# Patient Record
Sex: MALE | Race: BLACK OR AFRICAN AMERICAN | Employment: OTHER | URBAN - METROPOLITAN AREA
[De-identification: names, ages, dates, MRNs, and addresses within clinical notes are randomized per-mention and may not be internally consistent; named-entity substitution may affect disease eponyms.]

---

## 2017-01-18 ENCOUNTER — OFFICE VISIT (OUTPATIENT)
Dept: PAIN MANAGEMENT | Age: 72
End: 2017-01-18

## 2017-01-18 VITALS
HEIGHT: 73 IN | SYSTOLIC BLOOD PRESSURE: 182 MMHG | BODY MASS INDEX: 35.25 KG/M2 | DIASTOLIC BLOOD PRESSURE: 93 MMHG | HEART RATE: 67 BPM | WEIGHT: 266 LBS

## 2017-01-18 DIAGNOSIS — M25.569 ARTHRALGIA OF LOWER LEG, UNSPECIFIED LATERALITY: ICD-10-CM

## 2017-01-18 DIAGNOSIS — G89.29 CHRONIC LOW BACK PAIN, UNSPECIFIED BACK PAIN LATERALITY, WITH SCIATICA PRESENCE UNSPECIFIED: ICD-10-CM

## 2017-01-18 DIAGNOSIS — Z79.899 ENCOUNTER FOR LONG-TERM (CURRENT) USE OF HIGH-RISK MEDICATION: ICD-10-CM

## 2017-01-18 DIAGNOSIS — M46.1 SACROILIITIS (HCC): Primary | ICD-10-CM

## 2017-01-18 DIAGNOSIS — M51.37 DEGENERATION OF LUMBAR OR LUMBOSACRAL INTERVERTEBRAL DISC: ICD-10-CM

## 2017-01-18 DIAGNOSIS — K59.03 DRUG-INDUCED CONSTIPATION: ICD-10-CM

## 2017-01-18 DIAGNOSIS — M54.5 CHRONIC LOW BACK PAIN, UNSPECIFIED BACK PAIN LATERALITY, WITH SCIATICA PRESENCE UNSPECIFIED: ICD-10-CM

## 2017-01-18 DIAGNOSIS — F51.04 CHRONIC INSOMNIA: ICD-10-CM

## 2017-01-18 LAB
ALCOHOL UR POC: NORMAL
AMPHETAMINES UR POC: NORMAL
BARBITURATES UR POC: NORMAL
BENZODIAZEPINES UR POC: NORMAL
BUPRENORPHINE UR POC: NORMAL
CANNABINOIDS UR POC: NORMAL
CARISOPRODOL UR POC: NORMAL
COCAINE UR POC: NORMAL
FENTANYL UR POC: NORMAL
MDMA/ECSTASY UR POC: NORMAL
METHADONE UR POC: NORMAL
METHAMPHETAMINE UR POC: NORMAL
METHYLPHENIDATE UR POC: NORMAL
OPIATES UR POC: NORMAL
OXYCODONE UR POC: NORMAL
PHENCYCLIDINE UR POC: NORMAL
PROPOXYPHENE UR POC: NORMAL
TRAMADOL UR POC: NORMAL
TRICYCLICS UR POC: NORMAL

## 2017-01-18 RX ORDER — OXYCODONE AND ACETAMINOPHEN 10; 325 MG/1; MG/1
1 TABLET ORAL
Qty: 270 TAB | Refills: 0 | Status: SHIPPED | OUTPATIENT
Start: 2017-01-18 | End: 2017-04-11 | Stop reason: SDUPTHER

## 2017-01-18 RX ORDER — NALOXONE HYDROCHLORIDE 4 MG/.1ML
1 SPRAY NASAL ONCE
Qty: 1 BOTTLE | Refills: 0 | Status: SHIPPED | OUTPATIENT
Start: 2017-01-18 | End: 2017-01-18

## 2017-01-18 RX ORDER — MORPHINE SULFATE 60 MG/1
60 TABLET, FILM COATED, EXTENDED RELEASE ORAL 3 TIMES DAILY
Qty: 270 TAB | Refills: 0 | Status: SHIPPED | OUTPATIENT
Start: 2017-01-18 | End: 2017-04-11 | Stop reason: SDUPTHER

## 2017-01-18 RX ORDER — ZOLPIDEM TARTRATE 10 MG/1
10 TABLET ORAL
Qty: 90 TAB | Refills: 0 | Status: SHIPPED | OUTPATIENT
Start: 2017-01-18 | End: 2017-04-11 | Stop reason: SDUPTHER

## 2017-01-18 NOTE — PROGRESS NOTES
HISTORY OF PRESENT ILLNESS  Jim Balderrama is a 73 y.o. male. Patient presents for follow up of chronic, severe pain due to generalized osteoarthritis. He also suffers from chronic cervical and lumbar pain related to underlying spondylosis and degenerative disc disease. He reports that his pain in the lower back pain has been more pronounced over the past several weeks, to which he attributes recent inclement weather. Pain continues to predominate in the lumbar spine, knees, and hips. Medications continue to work well for pain control overall. Jim Balderrama is tolerating medications well, with no untoward side effects noted. He is able to stay more active with less discomfort with these current doses. The patient reports an average of 50% relief with this regimen. Most recent UDS and  were consistent with prescribed medications. Pill counts are appropriate. He is informed of side effects, risks, and benefits of this regimen, and emphasizes that he derives a significant improvement in functionality and quality of life, and notes that non-opioid medications and therapies in the past have not offered significant benefit. He denies new or worsening constipation issues. He denies any falls, injuries, or hospitalizations since the last visit. Sleep continues to be poor and non-restorative in spite of taking Ambien 10 mg nightly. He has history of sleep apnea, but he reports that since he lost over 100 lbs in the past few years, he feels that this has resolved. We discussed the importance of sleep hygiene, including limiting daytime naps. He reports that he falls asleep satisfactorily, but wakes up four hours later and has difficulty falling back to sleep. He goes to bed around 10 pm and wakes up around 6 am. We discussed that sleep needs decrease as humans age, and after the age of 61, the average is 6-7 hours. HPI--see above    ROS  Constitutional: Positive for weight loss and malaise/fatigue.    HENT: Positive for neck pain.    Cardiovascular: Positive for chest pain (occasional -has been followed by pcp and cardiology) and leg swelling. Gastrointestinal: Positive for abdominal pain and constipation. Musculoskeletal: Positive for myalgias, back pain and joint pain. Neurological: Positive for tingling and sensory change (lower legs due to diabetic neuropathy). Psychiatric/Behavioral: Negative for depression. The patient has insomnia. The patient is not nervous/anxious. Physical Exam  Constitutional: He is oriented to person, place, and time. He appears well-developed and well-nourished. No distress. Obese, pleasant male in NAD   HENT:   Head: Normocephalic and atraumatic. Eyes: EOM are normal.   Wears eyeglasses     Musculoskeletal:        Right knee: He exhibits decreased range of motion and bony tenderness. He exhibits no swelling. Tenderness found. Medial joint line tenderness noted. Left knee: He exhibits decreased range of motion and bony tenderness. He exhibits no swelling. Tenderness found. Medial joint line tenderness noted. Lumbar back: He exhibits decreased range of motion, tenderness and bony tenderness. Back:         Legs:  Areas of tenderness noted with red arrows  Marked spasms of lumbar paraspinous musculature noted  Crepitus of bilateral knees with passive ROM    Neurological: He is alert and oriented to person, place, and time. No cranial nerve deficit. He exhibits normal muscle tone. Coordination normal.   Psychiatric: He has a normal mood and affect. His behavior is normal. Judgment and thought content normal.   ASSESSMENT and PLAN    ICD-10-CM ICD-9-CM    1. Sacroiliitis (HCC) M46.1 720.2    2. Encounter for long-term (current) use of high-risk medication Z79.899 V58.69 DRUG SCREEN      AMB POC DRUG SCREEN ()   3. Chronic low back pain, unspecified back pain laterality, with sciatica presence unspecified M54.5 724.2     G89.29 338.29    4.  Degeneration of lumbar or lumbosacral intervertebral disc M51.37 722.52    5. Arthralgia of lower leg, unspecified laterality M25.569 719.46    6. Drug-induced constipation K59.03 564.09      E980.5    7. Chronic insomnia F51.04 780.52       Plan:  Continue same medications as prescribed for chronic pain  Consider changing ambien to trazodone for sleep--not addictive  Poor, fragmented night sleep is a universal symptom that accompanies many chronic pain syndromes. This is made much WORSE by daytime napping (anything longer than 20-30 minutes), as your circadian sleep/wake rhythms become dysregulated. However, daytime fatigue can be overwhelming, and it is often difficult to eliminate napping during the day. Several studies have shown improvement in fatigue with targeted, brief daytime naps. These are most effective if you follow these steps:  1)Lay down in a comfortable spot and nap for 20 minutes (no longer than 30 minutes!). If you don't fall asleep, that is ok. Relaxing comfortably with eyes closed is acceptable. 3) get out of bed. Do not sleep longer than 20-30 minutes. The best time to nap is between noon and 2 pm, and do not nap after 3 pm if possible. Also, remember to keep very regular sleep times and wake-up times every single day. Patients that suffer chronic insomnia with daytime fatigue are very sensitive to erratic sleep times.    Follow up in 3 months or sooner if needed  Regular exercise and attention to emotional health and diet remain the most effective ways to treat chronic pain of all kinds  You may contact me with questions or concerns through [address]

## 2017-01-18 NOTE — MR AVS SNAPSHOT
Visit Information Date & Time Provider Department Dept. Phone Encounter #  
 1/18/2017  9:40 AM Zeny Courser [address] [place] [address] for Pain Management [phone] [id number] Follow-up Instructions Return in about 3 months (around 4/18/2017). Upcoming Health Maintenance Date Due Hepatitis C Screening 1945 DTaP/Tdap/Td series (1 - Tdap) 11/24/1968 FOBT Q 1 YEAR AGE 50-75 11/24/1997 ZOSTER VACCINE AGE 60> 11/24/2007 GLAUCOMA SCREENING Q2Y 11/24/2012 Pneumococcal 65+ Low/Medium Risk (1 of 2 - PCV13) 11/24/2012 MEDICARE YEARLY EXAM 11/24/2012 INFLUENZA AGE 9 TO ADULT 8/1/2016 Allergies as of 1/18/2017  Review Complete On: 1/18/2017 By: Connor Rivera LPN No Known Allergies Current Immunizations  Never Reviewed No immunizations on file. Not reviewed this visit You Were Diagnosed With   
  
 Codes Comments Sac[facility]iliitis ([facility]    -  Primary ICD-10-CM: M46.1 ICD-9-CM: 720.2 Encounter for long-term (current) use of high-risk medication     ICD-10-CM: Z79.899 ICD-9-CM: V58.69 Chronic low back pain, unspecified back pain laterality, with sciatica presence unspecified     ICD-10-CM: M54.5, G89.29 ICD-9-CM: 724.2, 338.29 Degeneration of lumbar or lumbosacral intervertebral disc     ICD-10-CM: M51.37 
ICD-9-CM: 722.52 Arthralgia of lower leg, unspecified laterality     ICD-10-CM: M25.569 ICD-9-CM: 719.46 Drug-induced constipation     ICD-10-CM: K59.03 
ICD-9-CM: 564.09, E980.5 Chronic insomnia     ICD-10-CM: F51.04 
ICD-9-CM: 780.52 Vitals BP Pulse Height(growth percentile) Weight(growth percentile) BMI Smoking Status (!) 182/93 67 6' 1\" (1.854 m) 266 lb (120.7 kg) 35.09 kg/m2 Former Smoker Vitals History BMI and BSA Data Body Mass Index Body Surface Area 35.09 kg/m 2 2.49 m 2 Preferred Pharmacy Pharmacy Name Phone [facility] [address], [address] [address] [address] [facility] [phone] Your Updated Medication List  
  
   
This list is accurate as of: 1/18/17 10:45 AM.  Always use your most recent med list.  
  
  
  
  
 allopurinol 300 mg tablet Commonly known as:  Indy Dakins Take 300 mg by mouth daily. aspirin 325 mg tablet Commonly known as:  ASPIRIN Take 325 mg by mouth daily. CALCIUM CITRATE + D PO Take  by mouth. CARVEDILOL PO Take 25 mg by mouth two (2) times a day. CHLORTHALIDONE PO Take 25 mg by mouth daily. cloNIDine HCl 0.1 mg tablet Commonly known as:  CATAPRES Take 0.2 mg by mouth three (3) times daily. JANUVIA 25 mg tablet Generic drug:  SITagliptin Take 25 mg by mouth daily. morphine CR 60 mg CR tablet Commonly known as:  MS CONTIN Take 1 Tab by mouth three (3) times daily for 90 days. For chronic pain; Indications: CHRONIC PAIN, NEUROPATHIC PAIN, SEVERE PAIN  
  
 naloxone 4 mg/actuation Spry 1 Spray by Nasal route once for 1 dose. To use in case of accidental opioid overdose OTHER Lift for Right Shoe to correct leg length discrepancy  
  
 oxyCODONE-acetaminophen  mg per tablet Commonly known as:  PERCOCET Take 1 Tab by mouth three (3) times daily as needed for Pain for up to 90 days. (90 day bulk)  Indications: PAIN  
  
 SIMVASTATIN PO Take 25 mg by mouth daily. STAXYN 10 mg Tbdl Generic drug:  vardenafil Take  by mouth. VITAMIN C 1,000 mg tablet Generic drug:  ascorbic acid (vitamin C) Take  by mouth.  
  
 zolpidem 10 mg tablet Commonly known as:  AMBIEN Take 1 Tab by mouth nightly as needed for Sleep for up to 90 days. Max Daily Amount: 10 mg.  
  
  
  
  
Prescriptions Printed Refills  
 oxyCODONE-acetaminophen (PERCOCET)  mg per tablet 0  Sig: Take 1 Tab by mouth three (3) times daily as needed for Pain for up to 90 days. (90 day bulk)  Indications: PAIN Class: Print Route: Oral  
 morphine CR (MS CONTIN) 60 mg CR tablet 0 Sig: Take 1 Tab by mouth three (3) times daily for 90 days. For chronic pain; Indications: CHRONIC PAIN, NEUROPATHIC PAIN, SEVERE PAIN Class: Print Route: Oral  
 zolpidem (AMBIEN) 10 mg tablet 0 Sig: Take 1 Tab by mouth nightly as needed for Sleep for up to 90 days. Max Daily Amount: 10 mg.  
 Class: Print Route: Oral  
  
Prescriptions Sent to Pharmacy Refills  
 naloxone 4 mg/actuation spry 0 Si Spray by Nasal route once for 1 dose. To use in case of accidental opioid overdose Class: Normal  
 Pharmacy: [facility] [address] [place] [address] Rd, [address] #: [phone] Route: Nasal  
  
We Performed the Following AMB POC DRUG SCREEN () [ HCP] DRUG SCREEN [[id number] Custom] Follow-up Instructions Return in about 3 months (around 2017). Patient Instructions Plan: 
Continue same medications as prescribed for chronic pain 
Consider changing ambien to trazodone for sleep--not addictive Poor, fragmented night sleep is a universal symptom that accompanies many chronic pain syndromes. This is made much WORSE by daytime napping (anything longer than 20-30 minutes), as your circadian sleep/wake rhythms become dysregulated. However, daytime fatigue can be overwhelming, and it is often difficult to eliminate napping during the day. Several studies have shown improvement in fatigue with targeted, brief daytime naps. These are most effective if you follow these steps: 
1)Lay down in a comfortable spot and nap for 20 minutes (no longer than 30 minutes!). If you don't fall asleep, that is ok. Relaxing comfortably with eyes closed is acceptable. 3) get out of bed. Do not sleep longer than 20-30 minutes.  The best time to nap is between noon and 2 pm, and do not nap after 3 pm if possible. Also, remember to keep very regular sleep times and wake-up times every single day. Patients that suffer chronic insomnia with daytime fatigue are very sensitive to erratic sleep times. Follow up in 3 months or sooner if needed Regular exercise and attention to emotional health and diet remain the most effective ways to treat chronic pain of all kinds You may contact me with questions or concerns through [address] Introducing [facility]! Eli Anderson introduces [employer] patient portal. Now you can access parts of your medical record, email your doctor's office, and request medication refills online. 1. In your internet browser, go to [email or web address] 2. Click on the First Time User? Click Here link in the Sign In box. You will see the New Member Sign Up page. 3. Enter your [employer] Access Code exactly as it appears below. You will not need to use this code after youve completed the sign-up process. If you do not sign up before the expiration date, you must request a new code. · [employer] Access Code: [email or web address] Expires: 4/18/2017 10:45 AM 
 
4. Enter the last four digits of your Social Security Number (xxxx) and Date of Birth (mm/dd/yyyy) as indicated and click Submit. You will be taken to the next sign-up page. 5. Create a [employer] ID. This will be your [employer] login ID and cannot be changed, so think of one that is secure and easy to remember. 6. Create a [employer] password. You can change your password at any time. 7. Enter your Password Reset Question and Answer. This can be used at a later time if you forget your password. 8. Enter your e-mail address. You will receive e-mail notification when new information is available in [address] 9. Click Sign Up. You can now view and download portions of your medical record.  
10. Click the Download Summary menu link to download a portable copy of your medical information. If you have questions, please visit the Frequently Asked Questions section of the [employer] website. Remember, [employer] is NOT to be used for urgent needs. For medical emergencies, dial 911. Now available from your iPhone and Android! Please provide this summary of care documentation to your next provider. Your primary care clinician is listed as [address] [facility] [phone] [facility]. If you have any questions after today's visit, please call [phone].

## 2017-01-18 NOTE — PATIENT INSTRUCTIONS
Plan:  Continue same medications as prescribed for chronic pain  Consider changing ambien to trazodone for sleep--not addictive  Poor, fragmented night sleep is a universal symptom that accompanies many chronic pain syndromes. This is made much WORSE by daytime napping (anything longer than 20-30 minutes), as your circadian sleep/wake rhythms become dysregulated. However, daytime fatigue can be overwhelming, and it is often difficult to eliminate napping during the day. Several studies have shown improvement in fatigue with targeted, brief daytime naps. These are most effective if you follow these steps:  1)Lay down in a comfortable spot and nap for 20 minutes (no longer than 30 minutes!). If you don't fall asleep, that is ok. Relaxing comfortably with eyes closed is acceptable. 3) get out of bed. Do not sleep longer than 20-30 minutes. The best time to nap is between noon and 2 pm, and do not nap after 3 pm if possible. Also, remember to keep very regular sleep times and wake-up times every single day. Patients that suffer chronic insomnia with daytime fatigue are very sensitive to erratic sleep times.    Follow up in 3 months or sooner if needed  Regular exercise and attention to emotional health and diet remain the most effective ways to treat chronic pain of all kinds  You may contact me with questions or concerns through [address]

## 2017-01-18 NOTE — PROGRESS NOTES
Nursing Notes    Patient presents to the office today in follow-up. Patient rates his pain at 8/10 on the numerical pain scale. Reviewed medications with counts as follows:    Rx Date filled Qty Dispensed Pill Count Last Dose Short   Morphine sulfate ER 60 mg 10/25/16 270 15 today no   Percocet 10/325 mg 10/25/16 270 15 today no                            POC UDS was performed in office today    Any new labs or imaging since last appointment? NO    Have you been to an emergency room (ER) or urgent care clinic since your last visit? NO            Have you been hospitalized since your last visit? NO     If yes, where, when, and reason for visit? Have you seen or consulted any other health care providers outside of the [address] [place] [address]  since your last visit? NO     If yes, where, when, and reason for visit? HM deferred to pcp.

## 2017-04-11 ENCOUNTER — OFFICE VISIT (OUTPATIENT)
Dept: PAIN MANAGEMENT | Age: 72
End: 2017-04-11

## 2017-04-11 VITALS — HEART RATE: 93 BPM | SYSTOLIC BLOOD PRESSURE: 157 MMHG | RESPIRATION RATE: 19 BRPM | DIASTOLIC BLOOD PRESSURE: 99 MMHG

## 2017-04-11 DIAGNOSIS — M46.1 SACROILIITIS (HCC): ICD-10-CM

## 2017-04-11 DIAGNOSIS — K59.03 DRUG-INDUCED CONSTIPATION: ICD-10-CM

## 2017-04-11 DIAGNOSIS — G89.29 CHRONIC LOW BACK PAIN, UNSPECIFIED BACK PAIN LATERALITY, WITH SCIATICA PRESENCE UNSPECIFIED: Primary | ICD-10-CM

## 2017-04-11 DIAGNOSIS — M54.5 CHRONIC LOW BACK PAIN, UNSPECIFIED BACK PAIN LATERALITY, WITH SCIATICA PRESENCE UNSPECIFIED: Primary | ICD-10-CM

## 2017-04-11 DIAGNOSIS — Z79.899 ENCOUNTER FOR LONG-TERM (CURRENT) USE OF HIGH-RISK MEDICATION: ICD-10-CM

## 2017-04-11 DIAGNOSIS — M15.9 GENERALIZED OA: ICD-10-CM

## 2017-04-11 DIAGNOSIS — F51.04 CHRONIC INSOMNIA: ICD-10-CM

## 2017-04-11 DIAGNOSIS — M51.37 DEGENERATION OF LUMBAR OR LUMBOSACRAL INTERVERTEBRAL DISC: ICD-10-CM

## 2017-04-11 RX ORDER — MORPHINE SULFATE 60 MG/1
60 TABLET, FILM COATED, EXTENDED RELEASE ORAL 3 TIMES DAILY
Qty: 270 TAB | Refills: 0 | Status: SHIPPED | OUTPATIENT
Start: 2017-04-19 | End: 2017-07-06 | Stop reason: SDUPTHER

## 2017-04-11 RX ORDER — LUBIPROSTONE 24 UG/1
24 CAPSULE, GELATIN COATED ORAL
Qty: 90 CAP | Refills: 0 | Status: SHIPPED | OUTPATIENT
Start: 2017-04-11 | End: 2017-07-10

## 2017-04-11 RX ORDER — OXYCODONE AND ACETAMINOPHEN 10; 325 MG/1; MG/1
1 TABLET ORAL
Qty: 270 TAB | Refills: 0 | Status: SHIPPED | OUTPATIENT
Start: 2017-04-19 | End: 2017-07-06 | Stop reason: SDUPTHER

## 2017-04-11 RX ORDER — ZOLPIDEM TARTRATE 10 MG/1
10 TABLET ORAL
Qty: 90 TAB | Refills: 0 | Status: SHIPPED | OUTPATIENT
Start: 2017-04-19 | End: 2017-07-06 | Stop reason: SDUPTHER

## 2017-04-11 NOTE — PROGRESS NOTES
HISTORY OF PRESENT ILLNESS  Kirsty Fitzpatrick is a 73 y.o. male. Patient presents for follow up of chronic, severe pain due to generalized osteoarthritis. He also suffers from chronic cervical and lumbar pain related to underlying spondylosis and degenerative disc disease. He reports that his pain in the lower back pain has been more stable and generally well controlled over the past three months. Pain continues to predominate in the lumbar spine, knees, and hips. Pain is described as aching, stiff, and stabbing. Pain worsens with prolonged physical exertion, standing, and bending. He denies saddle anesthesia or bowel/bladder dysfunction or radicular symptoms. He reports that he has been suffering with \"some stomach troubles\" over the past couple of weeks. He reports an increase in constipation and nausea, and reports that miralax has not been as helpful of late. He uses Miralax about three times weekly, otherwise his stools become too loose. Over the counter stool softeners have not been effective, and stimulant laxatives produce abdominal cramping. We discussed treatment options at length--see treatment plan below. Medications continue to work well for pain control overall. Kirsty Fitzpatrick is tolerating medications well, with the exception of constipation as discussed above. He is able to stay more active with less discomfort with these current doses. The patient reports an average of 50% relief with this regimen. Most recent UDS and  were consistent with prescribed medications. Pill counts are appropriate. He is informed of side effects, risks, and benefits of this regimen, and emphasizes that he derives a significant improvement in functionality and quality of life, and notes that non-opioid medications and therapies in the past have not offered significant benefit. We will prescribe Naloxone 4 mg nasal spray to use in case of accidental overdose.  The patient is aware not to use Naloxone for any reasons other than opioid toxicity, as its use may precipitate withdrawals. The patient was instructed on directions and indications for use, and has also been advised to inform family members on how to use naloxone if overdose occurs. The patient expresses understanding. He states that he actually already has this prescription in his medicine cabinet at home. He denies new or worsening insomnia issues. He has worked on his sleep hygiene as discussed at his last visit (mainly limiting his daytime naps), and has seen some improvements. He denies any falls, injuries, or hospitalizations since the last visit. HPI--see above    ROS  Constitutional: Positive for weight loss and malaise/fatigue. HENT: Positive for neck pain. Cardiovascular: Positive for chest pain (occasional -has been followed by pcp and cardiology) and leg swelling. Gastrointestinal: Positive for abdominal pain and constipation. Musculoskeletal: Positive for myalgias, back pain and joint pain. Neurological: Positive for tingling and sensory change (lower legs due to diabetic neuropathy). Psychiatric/Behavioral: Negative for depression. The patient has insomnia. The patient is not nervous/anxious. Physical Exam  Constitutional: He is oriented to person, place, and time. He appears well-developed and well-nourished. No distress. Obese, pleasant male in NAD   HENT:   Head: Normocephalic and atraumatic. Eyes: EOM are normal.   Wears eyeglasses     Musculoskeletal:        Right knee: He exhibits decreased range of motion and bony tenderness. He exhibits no swelling. Tenderness found. Medial joint line tenderness noted. Left knee: He exhibits decreased range of motion and bony tenderness. He exhibits no swelling. Tenderness found. Medial joint line tenderness noted. Lumbar back: He exhibits decreased range of motion, tenderness and bony tenderness.         Back:         Legs:  Areas of tenderness noted with red arrows  Marked spasms of lumbar paraspinous musculature noted  Crepitus of bilateral knees with passive ROM    Neurological: He is alert and oriented to person, place, and time. No cranial nerve deficit. He exhibits normal muscle tone. Coordination normal.   Psychiatric: He has a normal mood and affect. His behavior is normal. Judgment and thought content normal.   ASSESSMENT and PLAN    ICD-10-CM ICD-9-CM    1. Chronic low back pain, unspecified back pain laterality, with sciatica presence unspecified M54.5 724.2     G89.29 338.29    2. Chronic insomnia F51.04 780.52    3. Generalized OA M15.9 715.00    4. Drug-induced constipation K59.03 564.09      E980.5    5. Sacroiliitis (HCC) M46.1 720.2    6. Degeneration of lumbar or lumbosacral intervertebral disc M51.37 722.52    7.  Encounter for long-term (current) use of high-risk medication Z79.899 V58.69       Plan:  Continue same medications as prescribed for chronic pain  You may take Amitiza 24 mcg once daily in the morning before breakfast for chronic constipation  Follow up in 3 months or sooner if needed  Regular exercise and attention to emotional health and diet remain the most effective ways to treat chronic pain of all kinds  You may contact me with questions or concerns through [employer]

## 2017-04-11 NOTE — PROGRESS NOTES
Nursing Notes    Patient presents to the office today in follow-up. Reviewed medications with counts as follows:    Rx Date filled Qty Dispensed Pill Count Last Dose Short   Morphine er 60 mg 1/22/17 270 33 This am no   Percocet 10/325 1/22/17 270 34 This am no   Mr. Ania Knott has a reminder for a \"due or due soon\" health maintenance. I have asked that he contact his primary care provider for follow-up on this health maintenance. POC UDS was not performed in office today    Any new labs or imaging since last appointment? YES  Labs    Have you been to an emergency room (ER) or urgent care clinic since your last visit? NO            Have you been hospitalized since your last visit? NO     If yes, where, when, and reason for visit? Have you seen or consulted any other health care providers outside of the [address] [place] [address]  since your last visit? YES     If yes, where, when, and reason for visit?    Eye and kidney specialist

## 2017-04-11 NOTE — PATIENT INSTRUCTIONS
Plan:  Continue same medications as prescribed for chronic pain  You may take Amitiza 24 mcg once daily in the morning before breakfast for chronic constipation  Follow up in 3 months or sooner if needed  Regular exercise and attention to emotional health and diet remain the most effective ways to treat chronic pain of all kinds  You may contact me with questions or concerns through [employer]

## 2017-06-01 PROBLEM — I20.0 UNSTABLE ANGINA (HCC): Status: ACTIVE | Noted: 2017-06-01

## 2017-07-06 ENCOUNTER — OFFICE VISIT (OUTPATIENT)
Dept: PAIN MANAGEMENT | Age: 72
End: 2017-07-06

## 2017-07-06 VITALS
BODY MASS INDEX: 29.52 KG/M2 | SYSTOLIC BLOOD PRESSURE: 129 MMHG | DIASTOLIC BLOOD PRESSURE: 79 MMHG | HEART RATE: 101 BPM | HEIGHT: 74 IN | WEIGHT: 230 LBS

## 2017-07-06 DIAGNOSIS — M25.562 ARTHRALGIA OF BOTH LOWER LEGS: ICD-10-CM

## 2017-07-06 DIAGNOSIS — M51.37 DEGENERATION OF LUMBAR OR LUMBOSACRAL INTERVERTEBRAL DISC: ICD-10-CM

## 2017-07-06 DIAGNOSIS — M15.9 GENERALIZED OA: Primary | ICD-10-CM

## 2017-07-06 DIAGNOSIS — Z79.899 ENCOUNTER FOR LONG-TERM (CURRENT) USE OF HIGH-RISK MEDICATION: ICD-10-CM

## 2017-07-06 DIAGNOSIS — M54.5 CHRONIC LOW BACK PAIN, UNSPECIFIED BACK PAIN LATERALITY, WITH SCIATICA PRESENCE UNSPECIFIED: ICD-10-CM

## 2017-07-06 DIAGNOSIS — M46.1 SACROILIITIS (HCC): ICD-10-CM

## 2017-07-06 DIAGNOSIS — K59.03 DRUG-INDUCED CONSTIPATION: ICD-10-CM

## 2017-07-06 DIAGNOSIS — F51.04 CHRONIC INSOMNIA: ICD-10-CM

## 2017-07-06 DIAGNOSIS — M25.561 ARTHRALGIA OF BOTH LOWER LEGS: ICD-10-CM

## 2017-07-06 DIAGNOSIS — G89.29 CHRONIC LOW BACK PAIN, UNSPECIFIED BACK PAIN LATERALITY, WITH SCIATICA PRESENCE UNSPECIFIED: ICD-10-CM

## 2017-07-06 RX ORDER — MORPHINE SULFATE 60 MG/1
60 TABLET, FILM COATED, EXTENDED RELEASE ORAL 3 TIMES DAILY
Qty: 270 TAB | Refills: 0 | Status: SHIPPED | OUTPATIENT
Start: 2017-07-15 | End: 2017-10-02 | Stop reason: SDUPTHER

## 2017-07-06 RX ORDER — OXYCODONE AND ACETAMINOPHEN 10; 325 MG/1; MG/1
1 TABLET ORAL
Qty: 270 TAB | Refills: 0 | Status: SHIPPED | OUTPATIENT
Start: 2017-07-15 | End: 2017-10-02 | Stop reason: SDUPTHER

## 2017-07-06 RX ORDER — ZOLPIDEM TARTRATE 10 MG/1
10 TABLET ORAL
Qty: 90 TAB | Refills: 0 | Status: SHIPPED | OUTPATIENT
Start: 2017-07-15 | End: 2017-10-02 | Stop reason: SDUPTHER

## 2017-07-06 NOTE — PROGRESS NOTES
Nursing Notes    Patient presents to the office today in follow-up. Patient rates his pain at 5/10 on the numerical pain scale. Reviewed medications with counts as follows:    Rx Date filled Qty Dispensed Pill Count Last Dose Short   ambien 10mg 04/22/17 90 9 yesterday no   Percocet 10-325mg 04/22/17 270 46 today no   MS Contin ER 60mg 04/22/17 270 46 today no                          Comments:     POC UDS was performed in office today    Any new labs or imaging since last appointment? YES    Have you been to an emergency room (ER) or urgent care clinic since your last visit? YES, [address]            Have you been hospitalized since your last visit? YES     If yes, where, when, and reason for visit? Have you seen or consulted any other health care providers outside of the [address] [place] [address]  since your last visit? YES     If yes, where, when, and reason for visit? HM deferred to pcp.

## 2017-07-06 NOTE — PATIENT INSTRUCTIONS
Plan:  Continue same medications as prescribed for chronic pain  For opioid-induced constipation, we will STOP amitiza and change to Linzess 290 mcg daily.  Take with breakfast once daily  Follow up in 3 months or sooner if needed  Regular exercise and attention to emotional health and diet remain the most effective ways to treat chronic pain of all kinds  You may contact me with questions or concerns through [address]

## 2017-07-06 NOTE — PROGRESS NOTES
HISTORY OF PRESENT ILLNESS  Jessica Magana is a 73 y.o. male. Patient presents for follow up of chronic, severe pain due to generalized osteoarthritis. He also suffers from chronic cervical and lumbar pain related to underlying spondylosis and degenerative disc disease. He reports that he was hospitalized at [facility] for three days in early June due to unstable angina. Cardiac cath revealed continued evidence of significant atherosclerosis of the coronary arteries, but the attending cardiologist did not recommend stenting, as he underwent 4-way CABG in May 2016. After medication changes, his blood pressure has been reduced considerably, and MR. Macel Gitelman reports no new angina episodes since discharge. Pain has been somewhat worse due to increased physical activity and stress, but he continues to endorse adequate pain relief with his current regimen and exercise (at his local gym). Pain continues to predominate in the lumbar spine, knees, and hips. Pain is described as aching, stiff, and stabbing. Pain worsens with prolonged physical exertion, standing, and bending. He denies saddle anesthesia or bowel/bladder dysfunction or radicular symptoms. Pt reports average of 3-5/10 pain scale most days with medications. Medications continue to work well for pain control overall. Jessica Magana is tolerating medications well, with the exception of persistent opioid induced constipation. Amitiza was unfortunately not effective in addressing constipation, and even with a daily stool softener or Miralax powder he still only has 1-2 bowel movements per week. We discussed treatment options at length--see treatment plan below. He is able to stay more active with less discomfort with these current doses. The patient reports an average of 60% relief with this regimen. Most recent UDS and  were consistent with prescribed medications. Pill counts are appropriate.  He is informed of side effects, risks, and benefits of this regimen, and emphasizes that he derives a significant improvement in functionality and quality of life, and notes that non-opioid medications and therapies in the past have not offered significant benefit. He denies new or worsening insomnia or constipation issues. He denies any falls, injuries, or hospitalizations since the last visit. HPI--see above    ROS  Constitutional: Positive for weight loss and malaise/fatigue. HENT: Positive for neck pain. Cardiovascular: Positive for chest pain (occasional -has been followed by pcp and cardiology) and leg swelling. Gastrointestinal: Positive for abdominal pain and constipation. Musculoskeletal: Positive for myalgias, back pain and joint pain. Neurological: Positive for tingling and sensory change (lower legs due to diabetic neuropathy). Psychiatric/Behavioral: Negative for depression. The patient has insomnia. The patient is not nervous/anxious. Physical Exam  Constitutional: He is oriented to person, place, and time. He appears well-developed and well-nourished. No distress. Obese, pleasant male in NAD   HENT:   Head: Normocephalic and atraumatic. Eyes: EOM are normal.   Wears eyeglasses     Musculoskeletal:        Right knee: He exhibits decreased range of motion and bony tenderness. He exhibits no swelling. Tenderness found. Medial joint line tenderness noted. Left knee: He exhibits decreased range of motion and bony tenderness. He exhibits no swelling. Tenderness found. Medial joint line tenderness noted. Lumbar back: He exhibits decreased range of motion, tenderness and bony tenderness. Back:         Legs:  Areas of tenderness noted with red arrows  Marked spasms of lumbar paraspinous musculature noted  Crepitus of bilateral knees with passive ROM    Neurological: He is alert and oriented to person, place, and time. No cranial nerve deficit. He exhibits normal muscle tone.  Coordination normal.   Psychiatric: He has a normal mood and affect. His behavior is normal. Judgment and thought content normal.   ASSESSMENT and PLAN    ICD-10-CM ICD-9-CM    1. Generalized OA M15.9 715.00    2. Encounter for long-term (current) use of high-risk medication Z79.899 V58.69 DRUG SCREEN      AMB POC DRUG SCREEN ()   3. Chronic insomnia F51.04 780.52    4. Drug-induced constipation K59.03 564.09      E980.5    5. Chronic low back pain, unspecified back pain laterality, with sciatica presence unspecified M54.5 724.2     G89.29 338.29    6. Sacroiliitis (HCC) M46.1 720.2    7. Degeneration of lumbar or lumbosacral intervertebral disc M51.37 722.52    8. Arthralgia of both lower legs M25.561 719.46     M25.562        Plan:  Continue same medications as prescribed for chronic pain  For opioid-induced constipation, we will STOP amitiza and change to Linzess 290 mcg daily.  Take with breakfast once daily  Follow up in 3 months or sooner if needed  Regular exercise and attention to emotional health and diet remain the most effective ways to treat chronic pain of all kinds  You may contact me with questions or concerns through [address]

## 2017-07-06 NOTE — MR AVS SNAPSHOT
Visit Information Date & Time Provider Department Dept. Phone Encounter #  
 7/6/2017  8:20 AM Theador Madison Perez PA-C [facility] [phone] [id number] Follow-up Instructions Return in about 3 months (around 10/6/2017). Upcoming Health Maintenance Date Due Hepatitis C Screening 1945 DTaP/Tdap/Td series (1 - Tdap) 11/24/1968 FOBT Q 1 YEAR AGE 50-75 11/24/1997 ZOSTER VACCINE AGE 60> 11/24/2007 GLAUCOMA SCREENING Q2Y 11/24/2012 Pneumococcal 65+ Low/Medium Risk (1 of 2 - PCV13) 11/24/2012 MEDICARE YEARLY EXAM 11/24/2012 INFLUENZA AGE 9 TO ADULT 8/1/2017 Allergies as of 7/6/2017  Review Complete On: 7/6/2017 By: Fco Amos No Known Allergies Current Immunizations  Never Reviewed No immunizations on file. Not reviewed this visit You Were Diagnosed With   
  
 Codes Comments Generalized OA    -  Primary ICD-10-CM: M15.9 ICD-9-CM: 715.00 Encounter for long-term (current) use of high-risk medication     ICD-10-CM: Z79.899 ICD-9-CM: V58.69 Chronic insomnia     ICD-10-CM: F51.04 
ICD-9-CM: 780.52 Drug-induced constipation     ICD-10-CM: K59.03 
ICD-9-CM: 564.09, E980.5 Chronic low back pain, unspecified back pain laterality, with sciatica presence unspecified     ICD-10-CM: M54.5, G89.29 ICD-9-CM: 724.2, 338.29 Sacroiliitis ([facility] [address]     ICD-10-CM: M46.1 ICD-9-CM: 720.2 Degeneration of lumbar or lumbosacral intervertebral disc     ICD-10-CM: M51.37 
ICD-9-CM: 722.52 Arthralgia of both lower legs     ICD-10-CM: M25.561, M25.562 ICD-9-CM: 719.46 Vitals BP Pulse Height(growth percentile) Weight(growth percentile) BMI Smoking Status 129/79 (!) 101 6' 2\" (1.88 m) 230 lb (104.3 kg) 29.53 kg/m2 Former Smoker BMI and BSA Data Body Mass Index Body Surface Area  
 29.53 kg/m 2 2.33 m 2 Preferred Pharmacy Pharmacy Name Phone [facility] [address], [address] [phone] Your Updated Medication List  
  
   
This list is accurate as of: 7/6/17  9:35 AM.  Always use your most recent med list.  
  
  
  
  
 allopurinol 300 mg tablet Commonly known as:  Charol Park Take 300 mg by mouth daily. aspirin 325 mg tablet Commonly known as:  ASPIRIN Take 325 mg by mouth daily. CALCIUM CITRATE + D PO Take  by mouth. CARVEDILOL PO Take 25 mg by mouth two (2) times a day. chlorthalidone 50 mg tablet Commonly known as:  Earnie Sleet Take 1 Tab by mouth daily. cloNIDine HCl 0.1 mg tablet Commonly known as:  CATAPRES Take 0.2 mg by mouth three (3) times daily. JANUVIA 25 mg tablet Generic drug:  SITagliptin Take 25 mg by mouth daily. LASIX 40 mg tablet Generic drug:  furosemide Take 40 mg by mouth as needed. linaclotide 290 mcg Cap capsule Commonly known as:  Miles Sheen Take 1 Cap by mouth Daily (before breakfast). For opioid induced constipation  
  
 lubiPROStone 24 mcg capsule Commonly known as:  Yareli Leoner Take 1 Cap by mouth daily (with breakfast) for 90 days. For opioid induced constipation  
  
 morphine CR 60 mg CR tablet Commonly known as:  MS CONTIN Take 1 Tab by mouth three (3) times daily for 90 days. For chronic pain; Indications: Chronic Pain, NEUROPATHIC PAIN, Severe Pain Start taking on:  7/15/2017 OTHER Lift for Right Shoe to correct leg length discrepancy  
  
 oxyCODONE-acetaminophen  mg per tablet Commonly known as:  PERCOCET Take 1 Tab by mouth three (3) times daily as needed for Pain for up to 90 days. (90 day bulk)  Indications: Pain Start taking on:  7/15/2017 SIMVASTATIN PO Take 20 mg by mouth nightly. STAXYN 10 mg Tbdi Generic drug:  vardenafil Take  by mouth. VITAMIN C 1,000 mg tablet Generic drug:  ascorbic acid (vitamin C) Take  by mouth.  
  
 zolpidem 10 mg tablet Commonly known as:  AMBIEN Take 1 Tab by mouth nightly as needed for Sleep for up to 90 days. Max Daily Amount: 10 mg. Start taking on:  7/15/2017 Prescriptions Printed Refills  
 oxyCODONE-acetaminophen (PERCOCET)  mg per tablet 0 Starting on: 7/15/2017 Sig: Take 1 Tab by mouth three (3) times daily as needed for Pain for up to 90 days. (90 day bulk)  Indications: Pain Class: Print Route: Oral  
 morphine CR (MS CONTIN) 60 mg CR tablet 0 Starting on: 7/15/2017 Sig: Take 1 Tab by mouth three (3) times daily for 90 days. For chronic pain; Indications: Chronic Pain, NEUROPATHIC PAIN, Severe Pain Class: Print Route: Oral  
 zolpidem (AMBIEN) 10 mg tablet 0 Starting on: 7/15/2017 Sig: Take 1 Tab by mouth nightly as needed for Sleep for up to 90 days. Max Daily Amount: 10 mg.  
 Class: Print Route: Oral  
  
Prescriptions Sent to Pharmacy Refills  
 linaclotide (LINZESS) 290 mcg cap capsule 5 Sig: Take 1 Cap by mouth Daily (before breakfast). For opioid induced constipation Class: Normal  
 [facility] [address] [place] [address] Rd, [address] #: [phone] Route: Oral  
  
We Performed the Following AMB POC DRUG SCREEN () [ [facility]] DRUG SCREEN [[id number] Custom] Follow-up Instructions Return in about 3 months (around 10/6/2017). To-Do List   
 07/13/2017  9:00 AM  
  Appointment with [facility] at [address] [facility],[address] Floor ([phone]) Bring your photo ID and insurance cards to your appointment. If you received an order from your physician, please bring as well.                                                                                                                            To reschedule or cancel, please contact Outpatient Scheduling at [phone]. Please bring a list of ALL medications that you are currently taking (include prescription and over the counter medications). Remember to list the dosages and when you take the medications. Please arrive 15 minutes before your appointment time at the [address] [place] [address] Dr Manzano next to [facility].                                                                                                                                               
  
  
Patient Instructions Plan: 
Continue same medications as prescribed for chronic pain For opioid-induced constipation, we will STOP amitiza and change to Linzess 290 mcg daily. Take with breakfast once daily Follow up in 3 months or sooner if needed Regular exercise and attention to emotional health and diet remain the most effective ways to treat chronic pain of all kinds You may contact me with questions or concerns through [address] Introducing [facility] & HEALTH SERVICES! Shay Stuart introduces [employer] patient portal. Now you can access parts of your medical record, email your doctor's office, and request medication refills online. 1. In your internet browser, go to [email or web address] 2. Click on the First Time User? Click Here link in the Sign In box. You will see the New Member Sign Up page. 3. Enter your [employer] Access Code exactly as it appears below. You will not need to use this code after youve completed the sign-up process. If you do not sign up before the expiration date, you must request a new code. · [employer] Access Code: [email or web address] Expires: 8/30/2017 10:21 AM 
 
4.  Enter the last four digits of your Social Security Number (xxxx) and Date of Birth (mm/dd/yyyy) as indicated and click Submit. You will be taken to the next sign-up page. 5. Create a [employer] ID. This will be your [employer] login ID and cannot be changed, so think of one that is secure and easy to remember. 6. Create a [employer] password. You can change your password at any time. 7. Enter your Password Reset Question and Answer. This can be used at a later time if you forget your password. 8. Enter your e-mail address. You will receive e-mail notification when new information is available in [address] 9. Click Sign Up. You can now view and download portions of your medical record. 10. Click the Download Summary menu link to download a portable copy of your medical information. If you have questions, please visit the Frequently Asked Questions section of the [employer] website. Remember, [employer] is NOT to be used for urgent needs. For medical emergencies, dial 911. Now available from your iPhone and Android! Please provide this summary of care documentation to your next provider. Your primary care clinician is listed as [address] [facility] [address] [facility]. If you have any questions after today's visit, please call [phone].

## 2017-10-02 ENCOUNTER — OFFICE VISIT (OUTPATIENT)
Dept: PAIN MANAGEMENT | Age: 72
End: 2017-10-02

## 2017-10-02 VITALS
WEIGHT: 235 LBS | SYSTOLIC BLOOD PRESSURE: 162 MMHG | DIASTOLIC BLOOD PRESSURE: 81 MMHG | HEART RATE: 57 BPM | BODY MASS INDEX: 31 KG/M2 | RESPIRATION RATE: 16 BRPM | TEMPERATURE: 97.3 F

## 2017-10-02 DIAGNOSIS — G89.29 CHRONIC LOW BACK PAIN, UNSPECIFIED BACK PAIN LATERALITY, WITH SCIATICA PRESENCE UNSPECIFIED: Primary | ICD-10-CM

## 2017-10-02 DIAGNOSIS — M51.37 DEGENERATION OF LUMBAR OR LUMBOSACRAL INTERVERTEBRAL DISC: ICD-10-CM

## 2017-10-02 DIAGNOSIS — M25.561 ARTHRALGIA OF BOTH LOWER LEGS: ICD-10-CM

## 2017-10-02 DIAGNOSIS — M54.5 CHRONIC LOW BACK PAIN, UNSPECIFIED BACK PAIN LATERALITY, WITH SCIATICA PRESENCE UNSPECIFIED: Primary | ICD-10-CM

## 2017-10-02 DIAGNOSIS — K59.03 DRUG-INDUCED CONSTIPATION: ICD-10-CM

## 2017-10-02 DIAGNOSIS — M79.662 PAIN OF LEFT LOWER LEG: ICD-10-CM

## 2017-10-02 DIAGNOSIS — Z79.899 ENCOUNTER FOR LONG-TERM (CURRENT) USE OF HIGH-RISK MEDICATION: ICD-10-CM

## 2017-10-02 DIAGNOSIS — M25.562 ARTHRALGIA OF BOTH LOWER LEGS: ICD-10-CM

## 2017-10-02 DIAGNOSIS — M46.1 SACROILIITIS (HCC): ICD-10-CM

## 2017-10-02 RX ORDER — OXYCODONE AND ACETAMINOPHEN 10; 325 MG/1; MG/1
1 TABLET ORAL
Qty: 270 TAB | Refills: 0 | Status: SHIPPED | OUTPATIENT
Start: 2017-10-05 | End: 2017-12-19 | Stop reason: SDUPTHER

## 2017-10-02 RX ORDER — ZOLPIDEM TARTRATE 10 MG/1
10 TABLET ORAL
Qty: 90 TAB | Refills: 0 | Status: SHIPPED | OUTPATIENT
Start: 2017-10-05 | End: 2017-12-19 | Stop reason: SDUPTHER

## 2017-10-02 RX ORDER — MORPHINE SULFATE 60 MG/1
60 TABLET, FILM COATED, EXTENDED RELEASE ORAL 3 TIMES DAILY
Qty: 270 TAB | Refills: 0 | Status: SHIPPED | OUTPATIENT
Start: 2017-10-05 | End: 2017-12-19 | Stop reason: SDUPTHER

## 2017-10-02 NOTE — PROGRESS NOTES
Nursing Notes    Patient presents to the office today in follow-up. Patient rates his pain at 7/10 on the numerical pain scale. Reviewed medications with counts as follows:    Rx Date filled Qty Dispensed Pill Count Last Dose Short   Morphine sulfate 60 mg ER 07/15/17 270 32 This am no   Percocet 10 mg 07/15/17 270 27 This am no   ambien 10 mg 07/15/17 90 2 Last pm          Comments:  Patient is here today for a follow up appt today he states his pain level today is a 7  He states he has seen his PCM and nephrologist he states labs were taken at both offices. His bp is a little elevated I will assess it again to see if it has changed. Patient states he is under a lot of stress dealing with a death in his family and another family member has cancer    POC UDS was not performed in office today    Any new labs or imaging since last appointment? YES labs at [facility]     Have you been to an emergency room (ER) or urgent care clinic since your last visit? NO            Have you been hospitalized since your last visit? NO     If yes, where, when, and reason for visit? Have you seen or consulted any other health care providers outside of the [facility]  since your last visit? YES   PCM  If yes, where, when, and reason for visit? Mr. Michael Villanueva has a reminder for a \"due or due soon\" health maintenance. I have asked that he contact his primary care provider for follow-up on this health maintenance.

## 2017-10-02 NOTE — MR AVS SNAPSHOT
Visit Information Date & Time Provider Department Dept. Phone Encounter #  
 10/2/2017  9:00 AM Clarice Chaudhari [address] [place] [address] Pain Management [phone] [id number] Follow-up Instructions Return in about 3 months (around 1/2/2018). Follow-up and Disposition History Upcoming Health Maintenance Date Due Hepatitis C Screening 1945 DTaP/Tdap/Td series (1 - Tdap) 11/24/1968 FOBT Q 1 YEAR AGE 50-75 11/24/1997 ZOSTER VACCINE AGE 60> 9/24/2007 GLAUCOMA SCREENING Q2Y 11/24/2012 Pneumococcal 65+ Low/Medium Risk (1 of 2 - PCV13) 11/24/2012 MEDICARE YEARLY EXAM 11/24/2012 INFLUENZA AGE 9 TO ADULT 8/1/2017 Allergies as of 10/2/2017  Review Complete On: 10/2/2017 By: Nell Yung LPN No Known Allergies Current Immunizations  Never Reviewed No immunizations on file. Not reviewed this visit You Were Diagnosed With   
  
 Codes Comments Chronic low back pain, unspecified back pain laterality, with sciatica presence unspecified    -  Primary ICD-10-CM: M54.5, G89.29 ICD-9-CM: 724.2, 338.29 Sacroiliitis ([facility] [address]     ICD-10-CM: M46.1 ICD-9-CM: 720.2 Degeneration of lumbar or lumbosacral intervertebral disc     ICD-10-CM: M51.37 
ICD-9-CM: 722.52 Arthralgia of both lower legs     ICD-10-CM: M25.561, M25.562 ICD-9-CM: 719.46 Pain of left lower leg     ICD-10-CM: Z57.760 ICD-9-CM: 729.5 Encounter for long-term (current) use of high-risk medication     ICD-10-CM: Z79.899 ICD-9-CM: V58.69 Drug-induced constipation     ICD-10-CM: K59.03 
ICD-9-CM: 564.09, E980.5 Vitals BP Pulse Temp Resp Weight(growth percentile) BMI  
 162/81 (!) 57 97.3 °F (36.3 °C) 16 235 lb (106.6 kg) 31 kg/m2 Smoking Status Former Smoker Vitals History BMI and BSA Data Body Mass Index Body Surface Area  
 31 kg/m 2 2.34 m 2 Preferred Pharmacy Pharmacy Name Phone Darian Goldsmith [address], [address] [facility] [address] [facility] [phone] Your Updated Medication List  
  
   
This list is accurate as of: 10/2/17 10:02 AM.  Always use your most recent med list.  
  
  
  
  
 allopurinol 300 mg tablet Commonly known as:  Clementeen Beard Take 300 mg by mouth daily. aspirin 325 mg tablet Commonly known as:  ASPIRIN Take 325 mg by mouth daily. CARVEDILOL PO Take 25 mg by mouth two (2) times a day. chlorthalidone 50 mg tablet Commonly known as:  Suzen Pay Take 1 Tab by mouth daily. cloNIDine HCl 0.1 mg tablet Commonly known as:  CATAPRES Take 0.2 mg by mouth three (3) times daily. ISOSORBIDE DINITRATE PO Take  by mouth. JANUVIA 25 mg tablet Generic drug:  SITagliptin Take 25 mg by mouth daily. LASIX 40 mg tablet Generic drug:  furosemide Take 40 mg by mouth as needed. morphine CR 60 mg CR tablet Commonly known as:  MS CONTIN Take 1 Tab by mouth three (3) times daily for 90 days. For chronic, severe pain; Early fill permitted 08/5/58 due to out of state travel  Indications: Chronic Pain, NEUROPATHIC PAIN, Severe Pain Start taking on:  10/5/2017 OTHER Lift for Right Shoe to correct leg length discrepancy  
  
 oxyCODONE-acetaminophen  mg per tablet Commonly known as:  PERCOCET Take 1 Tab by mouth three (3) times daily as needed for Pain for up to 90 days. (90 day bulk) Early fill permitted 97/7/91 due to out of state travel  Indications: Pain Start taking on:  10/5/2017 SIMVASTATIN PO Take 20 mg by mouth nightly. STAXYN 10 mg Tbdi Generic drug:  vardenafil Take  by mouth.  
  
 zolpidem 10 mg tablet Commonly known as:  AMBIEN Take 1 Tab by mouth nightly as needed for Sleep for up to 90 days. Max Daily Amount: 10 mg. Early fill permitted 12/0/08 due to out of state travel Start taking on:  10/5/2017 Prescriptions Printed Refills  
 oxyCODONE-acetaminophen (PERCOCET)  mg per tablet 0 Starting on: 10/5/2017 Sig: Take 1 Tab by mouth three (3) times daily as needed for Pain for up to 90 days. (90 day bulk) Early fill permitted 46/1/20 due to out of state travel  Indications: Pain Class: Print Route: Oral  
 morphine CR (MS CONTIN) 60 mg CR tablet 0 Starting on: 10/5/2017 Sig: Take 1 Tab by mouth three (3) times daily for 90 days. For chronic, severe pain; Early fill permitted 58/2/39 due to out of state travel  Indications: Chronic Pain, NEUROPATHIC PAIN, Severe Pain Class: Print Route: Oral  
 zolpidem (AMBIEN) 10 mg tablet 0 Starting on: 10/5/2017 Sig: Take 1 Tab by mouth nightly as needed for Sleep for up to 90 days. Max Daily Amount: 10 mg. Early fill permitted 58/2/92 due to out of state travel Class: Print Route: Oral  
  
Follow-up Instructions Return in about 3 months (around 1/2/2018). To-Do List   
 10/04/2017 9:45 AM  
  Appointment with [address] [place] [address] Road at [address] [place] [address],[address] ([phone])  
  
 10/06/2017 9:45 AM  
  Appointment with 11 [facility] at [address] [place] [address],[address] ([phone]) Patient Instructions Plan: 
Continue same medications as prescribed for chronic pain--early fill this month due to out of state travel to [place] Follow up in 3 months or sooner if needed Regular exercise and attention to emotional health and diet remain the most effective ways to treat chronic pain of all kinds You may contact me with questions or concerns through [address] Introducing [facility] & HEALTH SERVICES! Mayte Oreilly introduces [employer] patient portal. Now you can access parts of your medical record, email your doctor's office, and request medication refills online. 1. In your internet browser, go to [email or web address] 2. Click on the First Time User? Click Here link in the Sign In box. You will see the New Member Sign Up page. 3. Enter your [employer] Access Code exactly as it appears below. You will not need to use this code after youve completed the sign-up process. If you do not sign up before the expiration date, you must request a new code. · [employer] Access Code: [email or web address] Expires: 12/4/2017  9:08 AM 
 
4. Enter the last four digits of your Social Security Number (xxxx) and Date of Birth (mm/dd/yyyy) as indicated and click Submit. You will be taken to the next sign-up page. 5. Create a [employer] ID. This will be your [employer] login ID and cannot be changed, so think of one that is secure and easy to remember. 6. Create a [employer] password. You can change your password at any time. 7. Enter your Password Reset Question and Answer. This can be used at a later time if you forget your password. 8. Enter your e-mail address. You will receive e-mail notification when new information is available in [address] 9. Click Sign Up. You can now view and download portions of your medical record. 10. Click the Download Summary menu link to download a portable copy of your medical information. If you have questions, please visit the Frequently Asked Questions section of the [employer] website. Remember, [employer] is NOT to be used for urgent needs. For medical emergencies, dial 911. Now available from your iPhone and Android! Please provide this summary of care documentation to your next provider. Your primary care clinician is listed as [address] [facility] [address]. If you have any questions after today's visit, please call [phone].

## 2017-10-02 NOTE — PROGRESS NOTES
HISTORY OF PRESENT ILLNESS  Ann Reyna is a 73 y.o. male. Patient presents for follow up of chronic, severe pain due to generalized osteoarthritis. He also suffers from chronic cervical and lumbar pain related to underlying spondylosis and degenerative disc disease. He reports that he has been under a great deal of stress, noting that his daughter-in-law  suddenly from a PE after suffering a foot fracture in August. Then his  son was a week later diagnosed with cancer of the throat. His son is also a physician assistant with three children. We discussed these stressors at length. He was encouraged to monitor his blood pressure closely, as it is quite elevated today. Due to high stress levels, his pain has also been more pronounced, particularly in the lower back and knees. Pain is described as aching, stiff, and stabbing. Pain worsens with prolonged physical exertion, standing, and bending. He denies saddle anesthesia or bowel/bladder dysfunction or radicular symptoms. Medications continue to work well for pain control overall. Ann Reyna is tolerating medications well, with the exception of constipation. He reports that he could not afford Linzess, so he has continued with regular use of Miralax powder. This along with increased fluids and fiber has been modestly effective. Ann Reyna is able to stay more active with less discomfort with these current doses. The patient reports an average of 50% relief with this regimen. Most recent UDS and  were consistent with prescribed medications. Pill counts are appropriate. He is informed of side effects, risks, and benefits of this regimen, and emphasizes that he derives a significant improvement in functionality and quality of life, and notes that non-opioid medications and therapies in the past have not offered significant benefit.  He was approved for 90 day bulk on his pain medications several years ago and has thus far displayed no concerns for abuse or diversion of medications. We discussed the departure of Dr. Roverto Urena and myself from the practice. After prolonged discussion, he elects to stay with our practice for the time being. He denies new or worsening insomnia or constipation issues. He denies any falls, injuries, or hospitalizations since the last visit. A total of 45 minutes was spent with the patient of which more than 50% of the time was spent counseling the patient. HPI--see above    ROS  Constitutional: Positive for weight loss and malaise/fatigue. HENT: Positive for neck pain. Cardiovascular: Positive for chest pain (occasional -has been followed by pcp and cardiology) and leg swelling. Gastrointestinal: Positive for abdominal pain and constipation. Musculoskeletal: Positive for myalgias, back pain and joint pain. Neurological: Positive for tingling and sensory change (lower legs due to diabetic neuropathy). Psychiatric/Behavioral: Negative for depression. The patient has insomnia. The patient is not nervous/anxious. Physical Exam  Constitutional: He is oriented to person, place, and time. He appears well-developed and well-nourished. No distress. Obese, pleasant male in NAD   HENT:   Head: Normocephalic and atraumatic. Eyes: EOM are normal.   Wears eyeglasses     Musculoskeletal:        Right knee: He exhibits decreased range of motion and bony tenderness. He exhibits no swelling. Tenderness found. Medial joint line tenderness noted. Left knee: He exhibits decreased range of motion and bony tenderness. He exhibits no swelling. Tenderness found. Medial joint line tenderness noted. Lumbar back: He exhibits decreased range of motion, tenderness and bony tenderness. Back:         Legs:  Areas of tenderness noted with red arrows  Marked spasms of lumbar paraspinous musculature noted  Crepitus of bilateral knees with passive ROM    Neurological: He is alert and oriented to person, place, and time.  No cranial nerve deficit. He exhibits normal muscle tone. Coordination normal.   Psychiatric: He has a normal mood and affect. His behavior is normal. Judgment and thought content normal.   ASSESSMENT and PLAN    ICD-10-CM ICD-9-CM    1. Chronic low back pain, unspecified back pain laterality, with sciatica presence unspecified M54.5 724.2     G89.29 338.29    2. Sacroiliitis (HCC) M46.1 720.2    3. Degeneration of lumbar or lumbosacral intervertebral disc M51.37 722.52    4. Arthralgia of both lower legs M25.561 719.46     M25.562     5. Pain of left lower leg M79.662 729.5    6. Encounter for long-term (current) use of high-risk medication Z79.899 V58.69    7.  Drug-induced constipation K59.03 564.09      E980.5       Plan:  Continue same medications as prescribed for chronic pain--early fill this month due to out of state travel to [place]  Follow up in 3 months or sooner if needed  Regular exercise and attention to emotional health and diet remain the most effective ways to treat chronic pain of all kinds  You may contact me with questions or concerns through [address]

## 2017-10-02 NOTE — PATIENT INSTRUCTIONS
Plan:  Continue same medications as prescribed for chronic pain--early fill this month due to out of state travel to [place]  Follow up in 3 months or sooner if needed  Regular exercise and attention to emotional health and diet remain the most effective ways to treat chronic pain of all kinds  You may contact me with questions or concerns through [address]

## 2017-12-19 ENCOUNTER — OFFICE VISIT (OUTPATIENT)
Dept: PAIN MANAGEMENT | Age: 72
End: 2017-12-19

## 2017-12-19 VITALS
BODY MASS INDEX: 28.1 KG/M2 | SYSTOLIC BLOOD PRESSURE: 140 MMHG | WEIGHT: 212 LBS | RESPIRATION RATE: 14 BRPM | DIASTOLIC BLOOD PRESSURE: 80 MMHG | HEIGHT: 73 IN | TEMPERATURE: 97 F | HEART RATE: 64 BPM

## 2017-12-19 DIAGNOSIS — M51.37 DEGENERATION OF LUMBAR OR LUMBOSACRAL INTERVERTEBRAL DISC: ICD-10-CM

## 2017-12-19 DIAGNOSIS — G89.29 CHRONIC LOW BACK PAIN, UNSPECIFIED BACK PAIN LATERALITY, WITH SCIATICA PRESENCE UNSPECIFIED: ICD-10-CM

## 2017-12-19 DIAGNOSIS — M54.5 CHRONIC LOW BACK PAIN, UNSPECIFIED BACK PAIN LATERALITY, WITH SCIATICA PRESENCE UNSPECIFIED: ICD-10-CM

## 2017-12-19 DIAGNOSIS — M15.9 GENERALIZED OA: ICD-10-CM

## 2017-12-19 DIAGNOSIS — M46.1 SACROILIITIS (HCC): ICD-10-CM

## 2017-12-19 RX ORDER — ZOLPIDEM TARTRATE 10 MG/1
10 TABLET ORAL
Qty: 90 TAB | Refills: 0 | Status: SHIPPED | OUTPATIENT
Start: 2018-01-02 | End: 2018-03-23 | Stop reason: SDUPTHER

## 2017-12-19 RX ORDER — MORPHINE SULFATE 60 MG/1
60 TABLET, FILM COATED, EXTENDED RELEASE ORAL 3 TIMES DAILY
Qty: 270 TAB | Refills: 0 | Status: SHIPPED | OUTPATIENT
Start: 2018-01-02 | End: 2018-03-23 | Stop reason: SDUPTHER

## 2017-12-19 RX ORDER — OXYCODONE AND ACETAMINOPHEN 10; 325 MG/1; MG/1
1 TABLET ORAL
Qty: 270 TAB | Refills: 0 | Status: SHIPPED | OUTPATIENT
Start: 2018-01-02 | End: 2018-03-23 | Stop reason: SDUPTHER

## 2017-12-19 NOTE — PATIENT INSTRUCTIONS
1. Continue current plan with no evidence of addiction or diversion. Stable on current medication without adverse events. 2. Refill morphine ER 60 mg every 8 hours. 90 day supply  3. Refill oxycodone 10/325 mg up to 3 times daily as needed. 90 day supply  4. Refill Ambien 10 mg once nightly as needed for sleep. 90 day supply  5. Naloxone 4 mg nasal spray for opioid induced respiratory depression emergency only. 6. Discussed risks of addiction, dependency, and opioid induced hyperalgesia. 7. Return to clinic in 3 months         Preventing Falls: Care Instructions  Your Care Instructions    Getting around your home safely can be a challenge if you have injuries or health problems that make it easy for you to fall. Loose rugs and furniture in walkways are among the dangers for many older people who have problems walking or who have poor eyesight. People who have conditions such as arthritis, osteoporosis, or dementia also have to be careful not to fall. You can make your home safer with a few simple measures. Follow-up care is a key part of your treatment and safety. Be sure to make and go to all appointments, and call your doctor if you are having problems. It's also a good idea to know your test results and keep a list of the medicines you take. How can you care for yourself at home? Taking care of yourself  · You may get dizzy if you do not drink enough water. To prevent dehydration, drink plenty of fluids, enough so that your urine is light yellow or clear like water. Choose water and other caffeine-free clear liquids. If you have kidney, heart, or liver disease and have to limit fluids, talk with your doctor before you increase the amount of fluids you drink. · Exercise regularly to improve your strength, muscle tone, and balance. Walk if you can. Swimming may be a good choice if you cannot walk easily. · Have your vision and hearing checked each year or any time you notice a change.  If you have trouble seeing and hearing, you might not be able to avoid objects and could lose your balance. · Know the side effects of the medicines you take. Ask your doctor or pharmacist whether the medicines you take can affect your balance. Sleeping pills or sedatives can affect your balance. · Limit the amount of alcohol you drink. Alcohol can impair your balance and other senses. · Ask your doctor whether calluses or corns on your feet need to be removed. If you wear loose-fitting shoes because of calluses or corns, you can lose your balance and fall. · Talk to your doctor if you have numbness in your feet. Preventing falls at home  · Remove raised doorway thresholds, throw rugs, and clutter. Repair loose carpet or raised areas in the floor. · Move furniture and electrical cords to keep them out of walking paths. · Use nonskid floor wax, and wipe up spills right away, especially on ceramic tile floors. · If you use a walker or cane, put rubber tips on it. If you use crutches, clean the bottoms of them regularly with an abrasive pad, such as steel wool. · Keep your house well lit, especially [facility], and outside walkways. Use night-lights in areas such as hallways and bathrooms. Add extra light switches or use remote switches (such as switches that go on or off when you clap your hands) to make it easier to turn lights on if you have to get up during the night. · Install sturdy handrails on stairways. · Move items in your cabinets so that the things you use a lot are on the lower shelves (about waist level). · Keep a cordless phone and a flashlight with new batteries by your bed. If possible, put a phone in each of the main rooms of your house, or carry a cell phone in case you fall and cannot reach a phone. Or, you can wear a device around your neck or wrist. You push a button that sends a signal for help. · Wear low-heeled shoes that fit well and give your feet good support.  Use footwear with nonskid soles. Check the heels and soles of your shoes for wear. Repair or replace worn heels or soles. · Do not wear socks without shoes on wood floors. · Walk on the grass when the sidewalks are slippery. If you live in an area that gets snow and ice in the winter, sprinkle salt on slippery steps and sidewalks. Preventing falls in the bath  · Install grab bars and nonskid mats inside and outside your shower or tub and near the toilet and sinks. · Use shower chairs and bath benches. · Use a hand-held shower head that will allow you to sit while showering. · Get into a tub or shower by putting the weaker leg in first. Get out of a tub or shower with your strong side first.  · Repair loose toilet seats and consider installing a raised toilet seat to make getting on and off the toilet easier. · Keep your bathroom door unlocked while you are in the shower. Where can you learn more? Go to http://selina-trinity.info/. Enter [phone] [id number] in the search box to learn more about \"Preventing Falls: Care Instructions. \"  Current as of: May 12, 2017  Content Version: 11.4  © 9523-5943 Healthwise, [employer]. Care instructions adapted under license by [employer] (which disclaims liability or warranty for this information). If you have questions about a medical condition or this instruction, always ask your healthcare professional. [place] [address] any warranty or liability for your use of this information.

## 2017-12-19 NOTE — MR AVS SNAPSHOT
Visit Information Date & Time Provider Department Dept. Phone Encounter #  
 12/19/2017 12:40 PM Reggie De La Torre [facility] [phone] Follow-up Instructions Return in about 3 months (around 3/19/2018). Upcoming Health Maintenance Date Due Hepatitis C Screening 1945 DTaP/Tdap/Td series (1 - Tdap) 11/24/1968 FOBT Q 1 YEAR AGE 50-75 11/24/1997 ZOSTER VACCINE AGE 60> 9/24/2007 GLAUCOMA SCREENING Q2Y 11/24/2012 Pneumococcal 65+ Low/Medium Risk (1 of 2 - PCV13) 11/24/2012 MEDICARE YEARLY EXAM 11/24/2012 Influenza Age 5 to Adult 8/1/2017 Allergies as of 12/19/2017  Review Complete On: 12/19/2017 By: JOSEFINA Rodgers No Known Allergies Current Immunizations  Never Reviewed No immunizations on file. Not reviewed this visit You Were Diagnosed With   
  
 Codes Comments Chronic low back pain, unspecified back pain laterality, with sciatica presence unspecified     ICD-10-CM: M54.5, G89.29 ICD-9-CM: 724.2, 338.29 Sacroiliitis ([facility] [address]     ICD-10-CM: M46.1 ICD-9-CM: 720.2 Degeneration of lumbar or lumbosacral intervertebral disc     ICD-10-CM: M51.37 
ICD-9-CM: 722.52 Osteoarthrosis involving lower leg     ICD-10-CM: M17.10 ICD-9-CM: 715.96 Generalized OA     ICD-10-CM: M15.9 ICD-9-CM: 715.00 Vitals BP Pulse Temp Resp Height(growth percentile) Weight(growth percentile) 140/80 (BP 1 Location: Left arm, BP Patient Position: Sitting) 64 97 °F (36.1 °C) (Oral) 14 6' 1\" (1.854 m) 212 lb (96.2 kg) BMI Smoking Status 27.97 kg/m2 Former Smoker Vitals History BMI and BSA Data Body Mass Index Body Surface Area  
 27.97 kg/m 2 2.23 m 2 Preferred Pharmacy Pharmacy Name Phone [facility] [address], [address] [phone] Your Updated Medication List  
  
   
This list is accurate as of: 12/19/17  1:26 PM.  Always use your most recent med list.  
  
  
  
  
 allopurinol 300 mg tablet Commonly known as:  Sharri Gosling Take 300 mg by mouth daily. aspirin 325 mg tablet Commonly known as:  ASPIRIN Take 325 mg by mouth daily. CARVEDILOL PO Take 25 mg by mouth two (2) times a day. chlorthalidone 50 mg tablet Commonly known as:  Trini Folk Take 1 Tab by mouth daily. cloNIDine HCl 0.1 mg tablet Commonly known as:  CATAPRES Take 0.2 mg by mouth three (3) times daily. ISOSORBIDE DINITRATE PO Take  by mouth. JANUVIA 25 mg tablet Generic drug:  SITagliptin Take 25 mg by mouth daily. LASIX 40 mg tablet Generic drug:  furosemide Take 40 mg by mouth as needed. morphine CR 60 mg CR tablet Commonly known as:  MS CONTIN Take 1 Tab by mouth three (3) times daily for 90 days. For chronic, severe pain. Indications: Chronic Pain, NEUROPATHIC PAIN, Severe Pain Start taking on:  1/2/2018 NITROGLYCERIN PO Take  by mouth. OTHER Lift for Right Shoe to correct leg length discrepancy  
  
 oxyCODONE-acetaminophen  mg per tablet Commonly known as:  PERCOCET Take 1 Tab by mouth three (3) times daily as needed for Pain for up to 90 days. Indications: Pain Start taking on:  1/2/2018 SIMVASTATIN PO Take 20 mg by mouth nightly. STAXYN 10 mg Tbdi Generic drug:  vardenafil Take  by mouth.  
  
 zolpidem 10 mg tablet Commonly known as:  AMBIEN Take 1 Tab by mouth nightly as needed for Sleep for up to 90 days. Max Daily Amount: 10 mg. Start taking on:  1/2/2018 Prescriptions Printed Refills  
 morphine CR (MS CONTIN) 60 mg CR tablet 0 Starting on: 1/2/2018 Sig: Take 1 Tab by mouth three (3) times daily for 90 days. For chronic, severe pain. Indications: Chronic Pain, NEUROPATHIC PAIN, Severe Pain Class: Print Route: Oral  
 oxyCODONE-acetaminophen (PERCOCET)  mg per tablet 0 Starting on: 1/2/2018 Sig: Take 1 Tab by mouth three (3) times daily as needed for Pain for up to 90 days. Indications: Pain Class: Print Route: Oral  
 zolpidem (AMBIEN) 10 mg tablet 0 Starting on: 1/2/2018 Sig: Take 1 Tab by mouth nightly as needed for Sleep for up to 90 days. Max Daily Amount: 10 mg.  
 Class: Print Route: Oral  
  
Follow-up Instructions Return in about 3 months (around 3/19/2018). Patient Instructions 1. Continue current plan with no evidence of addiction or diversion. Stable on current medication without adverse events. 2. Refill morphine ER 60 mg every 8 hours. 90 day supply 3. Refill oxycodone 10/325 mg up to 3 times daily as needed. 90 day supply 4. Refill Ambien 10 mg once nightly as needed for sleep. 90 day supply 5. Naloxone 4 mg nasal spray for opioid induced respiratory depression emergency only. 6. Discussed risks of addiction, dependency, and opioid induced hyperalgesia. 7. Return to clinic in 3 months Preventing Falls: Care Instructions Your Care Instructions Getting around your home safely can be a challenge if you have injuries or health problems that make it easy for you to fall. Loose rugs and furniture in walkways are among the dangers for many older people who have problems walking or who have poor eyesight. People who have conditions such as arthritis, osteoporosis, or dementia also have to be careful not to fall. You can make your home safer with a few simple measures. Follow-up care is a key part of your treatment and safety. Be sure to make and go to all appointments, and call your doctor if you are having problems. It's also a good idea to know your test results and keep a list of the medicines you take. How can you care for yourself at home? Taking care of yourself · You may get dizzy if you do not drink enough water.  To prevent dehydration, drink plenty of fluids, enough so that your urine is light yellow or clear like water. Choose water and other caffeine-free clear liquids. If you have kidney, heart, or liver disease and have to limit fluids, talk with your doctor before you increase the amount of fluids you drink. · Exercise regularly to improve your strength, muscle tone, and balance. Walk if you can. Swimming may be a good choice if you cannot walk easily. · Have your vision and hearing checked each year or any time you notice a change. If you have trouble seeing and hearing, you might not be able to avoid objects and could lose your balance. · Know the side effects of the medicines you take. Ask your doctor or pharmacist whether the medicines you take can affect your balance. Sleeping pills or sedatives can affect your balance. · Limit the amount of alcohol you drink. Alcohol can impair your balance and other senses. · Ask your doctor whether calluses or corns on your feet need to be removed. If you wear loose-fitting shoes because of calluses or corns, you can lose your balance and fall. · Talk to your doctor if you have numbness in your feet. Preventing falls at home · Remove raised doorway thresholds, throw rugs, and clutter. Repair loose carpet or raised areas in the floor. · Move furniture and electrical cords to keep them out of walking paths. · Use nonskid floor wax, and wipe up spills right away, especially on ceramic tile floors. · If you use a walker or cane, put rubber tips on it. If you use crutches, clean the bottoms of them regularly with an abrasive pad, such as steel wool. · Keep your house well lit, especially [employer], and outside walkways. Use night-lights in areas such as hallways and bathrooms. Add extra light switches or use remote switches (such as switches that go on or off when you clap your hands) to make it easier to turn lights on if you have to get up during the night. · Install sturdy handrails on stairways. · Move items in your cabinets so that the things you use a lot are on the lower shelves (about waist level). · Keep a cordless phone and a flashlight with new batteries by your bed. If possible, put a phone in each of the main rooms of your house, or carry a cell phone in case you fall and cannot reach a phone. Or, you can wear a device around your neck or wrist. You push a button that sends a signal for help. · Wear low-heeled shoes that fit well and give your feet good support. Use footwear with nonskid soles. Check the heels and soles of your shoes for wear. Repair or replace worn heels or soles. · Do not wear socks without shoes on wood floors. · Walk on the grass when the sidewalks are slippery. If you live in an area that gets snow and ice in the winter, sprinkle salt on slippery steps and sidewalks. Preventing falls in the bath · Install grab bars and nonskid mats inside and outside your shower or tub and near the toilet and sinks. · Use shower chairs and bath benches. · Use a hand-held shower head that will allow you to sit while showering. · Get into a tub or shower by putting the weaker leg in first. Get out of a tub or shower with your strong side first. 
· Repair loose toilet seats and consider installing a raised toilet seat to make getting on and off the toilet easier. · Keep your bathroom door unlocked while you are in the shower. Where can you learn more? Go to http://selina-trinity.info/. Enter [phone] [id number] [phone] in the search box to learn more about \"Preventing Falls: Care Instructions. \" Current as of: May 12, 2017 Content Version: 11.4 © 5241-7204 Healthwise, [employer]. Care instructions adapted under license by [employer] (which disclaims liability or warranty for this information).  If you have questions about a medical condition or this instruction, always ask your healthcare professional. [employer] disclaims any warranty or liability for your use of this information. Introducing [facility]! Padma Ramirez introduces "[employer] patient portal. Now you can access parts of your medical record, email your doctor's office, and request medication refills online. 1. In your internet browser, go to [email or web address]. [employer] Relations 2. Click on the First Time User? Click Here link in the Sign In box. You will see the New Member Sign Up page. 3. Enter your "[employer] Access Code exactly as it appears below. You will not need to use this code after youve completed the sign-up process. If you do not sign up before the expiration date, you must request a new code. · "[employer] Access Code: [email or web address] Expires: 3/19/2018  1:26 PM 
 
4. Enter the last four digits of your Social Security Number (xxxx) and Date of Birth (mm/dd/yyyy) as indicated and click Submit. You will be taken to the next sign-up page. 5. Create a "[employer] ID. This will be your "[employer] login ID and cannot be changed, so think of one that is secure and easy to remember. 6. Create a "[employer] password. You can change your password at any time. 7. Enter your Password Reset Question and Answer. This can be used at a later time if you forget your password. 8. Enter your e-mail address. You will receive e-mail notification when new information is available in [address] 9. Click Sign Up. You can now view and download portions of your medical record. 10. Click the Download Summary menu link to download a portable copy of your medical information. If you have questions, please visit the Frequently Asked Questions section of the "[employer] website. Remember, "[employer] is NOT to be used for urgent needs. For medical emergencies, dial 911. Now available from your iPhone and Android! Please provide this summary of care documentation to your next provider. Your primary care clinician is listed as [address] [facility] [address] [facility]. If you have any questions after today's visit, please call [phone].

## 2017-12-19 NOTE — PROGRESS NOTES
HISTORY OF PRESENT ILLNESS  Christina Lewis is a 81 y.o. male    HPI: Mr. Ge Nguyen  returns today for f/u of chronic, severe pain due to generalized osteoarthritis. He also suffers from chronic cervical and lumbar pain related to underlying spondylosis and degenerative disc disease. Today is my first visit with Mr. Ge Nguyen. He continues with pain unchanged since last visit. We discussed his current condition and medications in detail today. He has been doing well with his current treatment plan which has been offering moderate pain control. He reports that he has been out of town recently to spend time with his son who has been battling cancer. He is otherwise doing well with no new complaints today. I will have him follow-up in 3 months or sooner if needed. We discussed that Dr. Hola Bronson is no longer working as a provider in this pain management practice. We discussed that this change may involve adjustments to currently prescribed pain medications. This patient is reminded that they have the option to transfer to another pain management clinic if desired. This patient understands and verbally agrees. Medications are helping with pain control and quality of life. His pain is 5-7/10 with medication and 10/10 without. Pt describes pain as aching, stiff, and stabbing. Aggravating factors include standing and walking. Relieved with rest, medication, and avoiding painful activities. Current treatment is helping to improve general activity, mood, walking, sleep, enjoyment of life    Mr. Ge Nguyen is tolerating medications well, with no side effects noted. He is able to stay more active with less discomfort with these current doses. The patient reports an average of 30-50% pain relief with current treatment/medications. Pill counts are appropriate.  He is informed of side effects, risks, and benefits of this regimen, and emphasizes that he derives a significant improvement in functionality and quality of life, and notes that non-opioid medications and therapies in the past have not offered significant benefit. Pt does report constipation but is well controlled with OTC medication  He  is otherwise doing well with no other complaints today. He denies any adverse events including nausea, vomiting, dizziness, increased constipation, hallucinations, or seizures. Because the patient's current regimen places him/her at increased risk for possible overdose, a prescription for naloxone nasal spray has been provided. The patient understands that this medication is only to be used in the setting of a possible overdose and that inadvertent use of this medication could precipitate overt withdrawal.       No Known Allergies    Past Surgical History:   Procedure Laterality Date    CARDIAC SURG PROCEDURE UNLIST  5/20/16    quad bypass    HX CATARACT REMOVAL Right 10/17/2016    HX CORONARY ARTERY BYPASS GRAFT       4 vessel CABG    HX CORONARY ARTERY BYPASS GRAFT  05/2016    HX GASTRIC BYPASS      HX HEENT      rt eye laser surgery    HX ORTHOPAEDIC      rt knee    LEFT HEART PERCUTANEOUS  6/1/2017              Review of Systems   Constitutional: Positive for malaise/fatigue. Negative for chills, fever and weight loss. HENT: Negative for congestion and sore throat. Eyes: Negative for blurred vision and double vision. Respiratory: Negative for cough, shortness of breath and wheezing. Cardiovascular: Positive for leg swelling. Negative for chest pain and palpitations. Gastrointestinal: Positive for constipation. Negative for abdominal pain, diarrhea, heartburn, nausea and vomiting. Genitourinary: Negative. Musculoskeletal: Positive for back pain, joint pain, myalgias and neck pain. Neurological: Negative for dizziness, seizures and loss of consciousness. Endo/Heme/Allergies: Does not bruise/bleed easily. Psychiatric/Behavioral: Negative for depression. The patient has insomnia.  The patient is not nervous/anxious. All other systems reviewed and are negative. Physical Exam   Constitutional: He is oriented to person, place, and time and well-developed, well-nourished, and in no distress. No distress. HENT:   Head: Normocephalic and atraumatic. Eyes: EOM are normal.   Pulmonary/Chest: Effort normal.   Neurological: He is alert and oriented to person, place, and time. Skin: Skin is dry. No rash noted. No erythema. Psychiatric: Mood, memory, affect and judgment normal.   Nursing note and vitals reviewed. ASSESSMENT:    1. Chronic low back pain, unspecified back pain laterality, with sciatica presence unspecified    2. Sacroiliitis ([address]    3. Degeneration of lumbar or lumbosacral intervertebral disc    4. Osteoarthrosis involving lower leg    5. Generalized OA           [place] Prescription Monitoring Program was reviewed which does not demonstrate aberrancies and/or inconsistencies with regard to the historical prescribing of controlled medications to this patient by other providers. PLAN / Pt Instructions:  1. Continue current plan with no evidence of addiction or diversion. Stable on current medication without adverse events. 2. Refill morphine ER 60 mg every 8 hours. 90 day supply  3. Refill oxycodone 10/325 mg up to 3 times daily as needed. 90 day supply  4. Refill Ambien 10 mg once nightly as needed for sleep. 90 day supply  5. Naloxone 4 mg nasal spray for opioid induced respiratory depression emergency only. 6. Discussed risks of addiction, dependency, and opioid induced hyperalgesia. 7. Return to clinic in 3 months    Medications Ordered Today   Medications    morphine CR (MS CONTIN) 60 mg CR tablet     Sig: Take 1 Tab by mouth three (3) times daily for 90 days. For chronic, severe pain.   Indications: Chronic Pain, NEUROPATHIC PAIN, Severe Pain     Dispense:  270 Tab     Refill:  0    oxyCODONE-acetaminophen (PERCOCET)  mg per tablet     Sig: Take 1 Tab by mouth three (3) times daily as needed for Pain for up to 90 days. Indications: Pain     Dispense:  270 Tab     Refill:  0    zolpidem (AMBIEN) 10 mg tablet     Sig: Take 1 Tab by mouth nightly as needed for Sleep for up to 90 days. Max Daily Amount: 10 mg. Dispense:  90 Tab     Refill:  0         DISPOSITION     Pain medications are prescribed with the objective of pain relief and improved physical and psychosocial function in this patient.  Pain Meds and Quality Of Life have been reviewed. Nonpharmacologic therapy and non-opioid pharmacologic therapy have been considered. Opioid therapy is only prescribed if the expected benefits are anticipated to outweigh risks.  Counseled patient on proper use of prescribed medications.  Reviewed with patient self-help tools, home exercise, and lifestyle changes to assist the patient in self-management of symptoms.  Reviewed with patient the treatment plan, goals of treatment plan, and limitations of treatment plan, to include the potential for side effects from medications and procedures. If side effects occur, it is the responsibility of the patient to inform the clinic so that a change in the treatment plan can be made in a safe manner. The patient is advised that stopping prescribed medication may cause an increase in symptoms and possible medication withdrawal symptoms. The patient is informed an emergency room evaluation may be necessary if this occurs. Spent 25 minutes with patient today which more than 50% of that time was spent on counseling and coordination of care. Adele Cristobal, [address] 12/19/2017        Note: Please excuse any typographical errors. Voice recognition software was used for this note and may cause mistakes.

## 2017-12-19 NOTE — PROGRESS NOTES
Nursing Notes    Patient presents to the office today in follow-up. Patient rates his pain at 7/10 on the numerical pain scale. Reviewed medications with counts as follows:    Rx Date filled Qty Dispensed Pill Count Last Dose Short   MORPHINE SULF ER 60 MG TAB 10/5/17 270 46 TODAY NO   PERCOCET  MG TAB 10/5/17 270 45 TODAY NO   AMBIEN 10 MG TAB 10/5/17 90 15 12/18/17 NO                          Comments:     POC UDS was not performed in office today    Any new labs or imaging since last appointment? NO    Have you been to an emergency room (ER) or urgent care clinic since your last visit? NO            Have you been hospitalized since your last visit? NO     If yes, where, when, and reason for visit? Have you seen or consulted any other health care providers outside of the [address] [place] [address]  since your last visit? NO     If yes, where, when, and reason for visit? HM deferred to pcp.

## 2018-03-21 ENCOUNTER — TELEPHONE (OUTPATIENT)
Dept: PAIN MANAGEMENT | Age: 73
End: 2018-03-21

## 2018-03-21 NOTE — TELEPHONE ENCOUNTER
Received call from patient ; patient was late on 03/20/18 ; patient is requesting medications ; please advise.

## 2018-03-22 ENCOUNTER — TELEPHONE (OUTPATIENT)
Dept: PAIN MANAGEMENT | Age: 73
End: 2018-03-22

## 2018-03-22 NOTE — TELEPHONE ENCOUNTER
Called patient to see if he could come in tomorrow morning at 0730 with Ray. Patient stated he could come in. I repeated myself and the patient verbalized understanding, and stated he would be here tomorrow morning.

## 2018-03-22 NOTE — TELEPHONE ENCOUNTER
He cannot do a bridge until follow-up appointment has been scheduled.  May add 730 appointment (Routing comment) /rc

## 2018-03-23 ENCOUNTER — OFFICE VISIT (OUTPATIENT)
Dept: PAIN MANAGEMENT | Age: 73
End: 2018-03-23

## 2018-03-23 VITALS
HEIGHT: 73 IN | HEART RATE: 57 BPM | DIASTOLIC BLOOD PRESSURE: 79 MMHG | TEMPERATURE: 97.1 F | BODY MASS INDEX: 28.1 KG/M2 | RESPIRATION RATE: 14 BRPM | SYSTOLIC BLOOD PRESSURE: 152 MMHG | WEIGHT: 212 LBS

## 2018-03-23 DIAGNOSIS — M25.562 ARTHRALGIA OF BOTH LOWER LEGS: ICD-10-CM

## 2018-03-23 DIAGNOSIS — M15.9 GENERALIZED OA: ICD-10-CM

## 2018-03-23 DIAGNOSIS — G89.29 CHRONIC LOW BACK PAIN, UNSPECIFIED BACK PAIN LATERALITY, WITH SCIATICA PRESENCE UNSPECIFIED: ICD-10-CM

## 2018-03-23 DIAGNOSIS — M46.1 SACROILIITIS (HCC): ICD-10-CM

## 2018-03-23 DIAGNOSIS — M51.37 DEGENERATION OF LUMBAR OR LUMBOSACRAL INTERVERTEBRAL DISC: ICD-10-CM

## 2018-03-23 DIAGNOSIS — M54.5 CHRONIC LOW BACK PAIN, UNSPECIFIED BACK PAIN LATERALITY, WITH SCIATICA PRESENCE UNSPECIFIED: ICD-10-CM

## 2018-03-23 DIAGNOSIS — M25.561 ARTHRALGIA OF BOTH LOWER LEGS: ICD-10-CM

## 2018-03-23 DIAGNOSIS — G47.00 INSOMNIA, UNSPECIFIED TYPE: Primary | ICD-10-CM

## 2018-03-23 RX ORDER — OXYCODONE AND ACETAMINOPHEN 10; 325 MG/1; MG/1
1 TABLET ORAL
Qty: 270 TAB | Refills: 0 | Status: SHIPPED | OUTPATIENT
Start: 2018-04-01 | End: 2018-06-29 | Stop reason: SDUPTHER

## 2018-03-23 RX ORDER — ZOLPIDEM TARTRATE 10 MG/1
10 TABLET ORAL
Qty: 90 TAB | Refills: 0 | Status: SHIPPED | OUTPATIENT
Start: 2018-04-01 | End: 2018-06-29 | Stop reason: SDUPTHER

## 2018-03-23 RX ORDER — MORPHINE SULFATE 60 MG/1
60 TABLET, FILM COATED, EXTENDED RELEASE ORAL 3 TIMES DAILY
Qty: 270 TAB | Refills: 0 | Status: SHIPPED | OUTPATIENT
Start: 2018-04-06 | End: 2018-06-29 | Stop reason: SDUPTHER

## 2018-03-23 NOTE — PROGRESS NOTES
HISTORY OF PRESENT ILLNESS  Nayeli Shine is a 81 y.o. male    HPI: Mr. David Gonzalez  returns today for f/u of chronic, severe pain due to generalized osteoarthritis. He also suffers from chronic cervical and lumbar pain related to underlying spondylosis and degenerative disc disease. Mr. David Gonzalez continues unchanged since last visit. He continues to do well with his current treatment plan which has been offering moderate pain control. He reports no new complaints today. We will continue with his current treatment plan with no changes. I will have him follow-up in 3 months for further evaluation and recommendation       Medications are helping with pain control and quality of life. His pain is 5-7/10 with medication and 10/10 without. Pt describes pain as aching, stiff, and stabbing. Aggravating factors include standing and walking. Relieved with rest, medication, and avoiding painful activities. Current treatment is helping to improve general activity, mood, walking, sleep, enjoyment of life    Mr. David Gonzalez is tolerating medications well, with no side effects noted. He is able to stay more active with less discomfort with these current doses. The patient reports an average of 30-50% pain relief with current treatment/medications. Pill counts are appropriate. He is informed of side effects, risks, and benefits of this regimen, and emphasizes that he derives a significant improvement in functionality and quality of life, and notes that non-opioid medications and therapies in the past have not offered significant benefit. Pt does report constipation but is well controlled with OTC medication  He  is otherwise doing well with no other complaints today. He denies any adverse events including nausea, vomiting, dizziness, increased constipation, hallucinations, or seizures.      Because the patient's current regimen places him/her at increased risk for possible overdose, a prescription for naloxone nasal spray has been provided. The patient understands that this medication is only to be used in the setting of a possible overdose and that inadvertent use of this medication could precipitate overt withdrawal.         No Known Allergies    Past Surgical History:   Procedure Laterality Date    CARDIAC SURG PROCEDURE UNLIST  5/20/16    quad bypass    HX CATARACT REMOVAL Right 10/17/2016    HX CORONARY ARTERY BYPASS GRAFT       4 vessel CABG    HX CORONARY ARTERY BYPASS GRAFT  05/2016    HX GASTRIC BYPASS      HX HEENT      rt eye laser surgery    HX ORTHOPAEDIC      rt knee    LEFT HEART PERCUTANEOUS  6/1/2017              Review of Systems   Constitutional: Positive for malaise/fatigue. Negative for chills, fever and weight loss. HENT: Negative for congestion and sore throat. Eyes: Negative for blurred vision and double vision. Respiratory: Negative for cough, shortness of breath and wheezing. Cardiovascular: Positive for leg swelling. Negative for chest pain and palpitations. Gastrointestinal: Positive for constipation. Negative for abdominal pain, diarrhea, heartburn, nausea and vomiting. Genitourinary: Negative. Musculoskeletal: Positive for back pain, joint pain, myalgias and neck pain. Neurological: Negative for dizziness, seizures and loss of consciousness. Endo/Heme/Allergies: Does not bruise/bleed easily. Psychiatric/Behavioral: Negative for depression. The patient has insomnia. The patient is not nervous/anxious. All other systems reviewed and are negative. Physical Exam   Constitutional: He is oriented to person, place, and time and well-developed, well-nourished, and in no distress. No distress. HENT:   Head: Normocephalic and atraumatic. Eyes: EOM are normal.   Pulmonary/Chest: Effort normal.   Neurological: He is alert and oriented to person, place, and time. Skin: Skin is dry. No rash noted. No erythema.    Psychiatric: Mood, memory, affect and judgment normal.   Nursing note and vitals reviewed. ASSESSMENT:    1. Insomnia, unspecified type    2. Degeneration of lumbar or lumbosacral intervertebral disc    3. Generalized OA    4. Chronic low back pain, unspecified back pain laterality, with sciatica presence unspecified    5. Sacroiliitis ([address]    6. Arthralgia of both lower legs           [place] Prescription Monitoring Program was reviewed which does not demonstrate aberrancies and/or inconsistencies with regard to the historical prescribing of controlled medications to this patient by other providers. PLAN / Pt Instructions:  1. Continue current plan with no evidence of addiction or diversion. Stable on current medication without adverse events. 2. Refill morphine ER 60 mg every 8 hours. 90 day supply  3. Refill oxycodone 10/325 mg up to 3 times daily as needed. 90 day supply  4. Refill Ambien 10 mg once nightly as needed for sleep. 90 day supply  5. Naloxone 4 mg nasal spray for opioid induced respiratory depression emergency only. 6. Discussed risks of addiction, dependency, and opioid induced hyperalgesia. 7. Return to clinic in 3 months      Medications Ordered Today   Medications    morphine CR (MS CONTIN) 60 mg CR tablet     Sig: Take 1 Tab by mouth three (3) times daily for 90 days. For chronic, severe pain. Indications: Chronic Pain, NEUROPATHIC PAIN, Severe Pain     Dispense:  270 Tab     Refill:  0    oxyCODONE-acetaminophen (PERCOCET)  mg per tablet     Sig: Take 1 Tab by mouth three (3) times daily as needed for Pain for up to 90 days. Indications: Pain     Dispense:  270 Tab     Refill:  0    zolpidem (AMBIEN) 10 mg tablet     Sig: Take 1 Tab by mouth nightly as needed for Sleep for up to 90 days. Max Daily Amount: 10 mg. Dispense:  90 Tab     Refill:  0         DISPOSITION     Pain medications are prescribed with the objective of pain relief and improved physical and psychosocial function in this patient.     Pain Meds and Quality Of Life have been reviewed. Nonpharmacologic therapy and non-opioid pharmacologic therapy have been considered. Opioid therapy is only prescribed if the expected benefits are anticipated to outweigh risks.  Counseled patient on proper use of prescribed medications.  Reviewed with patient self-help tools, home exercise, and lifestyle changes to assist the patient in self-management of symptoms.  Reviewed with patient the treatment plan, goals of treatment plan, and limitations of treatment plan, to include the potential for side effects from medications and procedures. If side effects occur, it is the responsibility of the patient to inform the clinic so that a change in the treatment plan can be made in a safe manner. The patient is advised that stopping prescribed medication may cause an increase in symptoms and possible medication withdrawal symptoms. The patient is informed an emergency room evaluation may be necessary if this occurs. Spent 25 minutes with patient today which more than 50% of that time was spent on counseling and coordination of care. Celia Duff 3/23/2018        Note: Please excuse any typographical errors. Voice recognition software was used for this note and may cause mistakes.

## 2018-03-23 NOTE — MR AVS SNAPSHOT
[address] [id number] 
[phone] Patient: Oni Abebe MRN: [id number] :1945 Visit Information Date & Time Provider Department Dept. Phone Encounter #  
 3/23/2018  7:30 AM [facility] [phone] [id number] Follow-up Instructions Return in about 3 months (around 2018). Upcoming Health Maintenance Date Due Hepatitis C Screening 1945 DTaP/Tdap/Td series (1 - Tdap) 1968 FOBT Q 1 YEAR AGE 50-75 1997 ZOSTER VACCINE AGE 60> 2007 GLAUCOMA SCREENING Q2Y 2012 Pneumococcal 65+ Low/Medium Risk (1 of 2 - PCV13) 2012 Influenza Age 5 to Adult 2017 MEDICARE YEARLY EXAM 3/14/2018 Allergies as of 3/23/2018  Review Complete On: 3/23/2018 By: Jimmie Hicks LPN No Known Allergies Current Immunizations  Never Reviewed No immunizations on file. Not reviewed this visit You Were Diagnosed With   
  
 Codes Comments Insomnia, unspecified type    -  Primary ICD-10-CM: G47.00 ICD-9-CM: 780.52 Degeneration of lumbar or lumbosacral intervertebral disc     ICD-10-CM: M51.37 
ICD-9-CM: 722.52 Generalized OA     ICD-10-CM: M15.9 ICD-9-CM: 715.00 Chronic low back pain, unspecified back pain laterality, with sciatica presence unspecified     ICD-10-CM: M54.5, G89.29 ICD-9-CM: 724.2, 338.29 Sacroiliitis ([facility] [address]     ICD-10-CM: M46.1 ICD-9-CM: 720.2 Arthralgia of both lower legs     ICD-10-CM: M25.561, M25.562 ICD-9-CM: 719.46 Vitals BP Pulse Temp Resp Height(growth percentile) Weight(growth percentile) 152/79 (BP 1 Location: Left arm, BP Patient Position: Sitting) (!) 57 97.1 °F (36.2 °C) (Oral) 14 6' 1\" (1.854 m) 212 lb (96.2 kg) BMI Smoking Status 27.97 kg/m2 Former Smoker Vitals History BMI and BSA Data Body Mass Index Body Surface Area  
 27.97 kg/m 2 2.23 m 2 Preferred Pharmacy Pharmacy Name Phone [facility] [address], [address] [facility] [address] [facility] [phone] Your Updated Medication List  
  
   
This list is accurate as of 3/23/18  8:13 AM.  Always use your most recent med list.  
  
  
  
  
 allopurinol 300 mg tablet Commonly known as:  Pheobe Price Take 300 mg by mouth daily. aspirin 325 mg tablet Commonly known as:  ASPIRIN Take 325 mg by mouth daily. CARVEDILOL PO Take 25 mg by mouth two (2) times a day. chlorthalidone 50 mg tablet Commonly known as:  Floy Skeeters Take 1 Tab by mouth daily. cloNIDine HCl 0.1 mg tablet Commonly known as:  CATAPRES Take 0.2 mg by mouth three (3) times daily. ISOSORBIDE DINITRATE PO Take  by mouth. JANUVIA 25 mg tablet Generic drug:  SITagliptin Take 25 mg by mouth daily. LASIX 40 mg tablet Generic drug:  furosemide Take 40 mg by mouth as needed. morphine CR 60 mg CR tablet Commonly known as:  MS CONTIN Take 1 Tab by mouth three (3) times daily for 90 days. For chronic, severe pain. Indications: Chronic Pain, NEUROPATHIC PAIN, Severe Pain Start taking on:  4/6/2018 NITROGLYCERIN PO Take  by mouth. OTHER Lift for Right Shoe to correct leg length discrepancy  
  
 oxyCODONE-acetaminophen  mg per tablet Commonly known as:  PERCOCET Take 1 Tab by mouth three (3) times daily as needed for Pain for up to 90 days. Indications: Pain Start taking on:  4/1/2018 SIMVASTATIN PO Take 20 mg by mouth nightly. STAXYN 10 mg Tbdi Generic drug:  vardenafil Take  by mouth.  
  
 zolpidem 10 mg tablet Commonly known as:  AMBIEN Take 1 Tab by mouth nightly as needed for Sleep for up to 90 days. Max Daily Amount: 10 mg. Start taking on:  4/1/2018 Prescriptions Printed Refills  
 morphine CR (MS CONTIN) 60 mg CR tablet 0 Starting on: 4/6/2018 Sig: Take 1 Tab by mouth three (3) times daily for 90 days. For chronic, severe pain. Indications: Chronic Pain, NEUROPATHIC PAIN, Severe Pain Class: Print Route: Oral  
 oxyCODONE-acetaminophen (PERCOCET)  mg per tablet 0 Starting on: 4/1/2018 Sig: Take 1 Tab by mouth three (3) times daily as needed for Pain for up to 90 days. Indications: Pain Class: Print Route: Oral  
 zolpidem (AMBIEN) 10 mg tablet 0 Starting on: 4/1/2018 Sig: Take 1 Tab by mouth nightly as needed for Sleep for up to 90 days. Max Daily Amount: 10 mg.  
 Class: Print Route: Oral  
  
Follow-up Instructions Return in about 3 months (around 6/23/2018). Patient Instructions 1. Continue current plan with no evidence of addiction or diversion. Stable on current medication without adverse events. 2. Refill morphine ER 60 mg every 8 hours. 90 day supply 3. Refill oxycodone 10/325 mg up to 3 times daily as needed. 90 day supply 4. Refill Ambien 10 mg once nightly as needed for sleep. 90 day supply 5. Naloxone 4 mg nasal spray for opioid induced respiratory depression emergency only. 6. Discussed risks of addiction, dependency, and opioid induced hyperalgesia. 7. Return to clinic in 3 months Introducing [facility]! Norberto Robert introduces Ahead patient portal. Now you can access parts of your medical record, email your doctor's office, and request medication refills online. 1. In your internet browser, go to [email or web address] 2. Click on the First Time User? Click Here link in the Sign In box. You will see the New Member Sign Up page. 3. Enter your Ahead Access Code exactly as it appears below. You will not need to use this code after youve completed the sign-up process. If you do not sign up before the expiration date, you must request a new code. · [employer] Access Code: [email or web address] Expires: 6/21/2018  8:13 AM 
 
4. Enter the last four digits of your Social Security Number (xxxx) and Date of Birth (mm/dd/yyyy) as indicated and click Submit. You will be taken to the next sign-up page. 5. Create a Zane Prep ID. This will be your [employer] Prep login ID and cannot be changed, so think of one that is secure and easy to remember. 6. Create a [employer] Prep password. You can change your password at any time. 7. Enter your Password Reset Question and Answer. This can be used at a later time if you forget your password. 8. Enter your e-mail address. You will receive e-mail notification when new information is available in [address] 9. Click Sign Up. You can now view and download portions of your medical record. 10. Click the Download Summary menu link to download a portable copy of your medical information. If you have questions, please visit the Frequently Asked Questions section of the [employer] website. Remember, [employer] Prep is NOT to be used for urgent needs. For medical emergencies, dial 911. Now available from your iPhone and Android! Please provide this summary of care documentation to your next provider. Your primary care clinician is listed as [phone] [facility] [phone] [facility]. If you have any questions after today's visit, please call [phone].

## 2018-03-23 NOTE — PATIENT INSTRUCTIONS
1. Continue current plan with no evidence of addiction or diversion. Stable on current medication without adverse events. 2. Refill morphine ER 60 mg every 8 hours. 90 day supply  3. Refill oxycodone 10/325 mg up to 3 times daily as needed. 90 day supply  4. Refill Ambien 10 mg once nightly as needed for sleep. 90 day supply  5. Naloxone 4 mg nasal spray for opioid induced respiratory depression emergency only. 6. Discussed risks of addiction, dependency, and opioid induced hyperalgesia.    7. Return to clinic in 3 months

## 2018-03-23 NOTE — PROGRESS NOTES
Nursing Notes    Patient presents to the office today in follow-up. Patient rates his pain at 6/10 on the numerical pain scale. Reviewed medications with counts as follows:    Rx Date filled Qty Dispensed Pill Count Last Dose Short   Morphine sulfate ER 60 mg  01/09/18 270 54 today no   Percocet 10/325 mg 01/02/18 270 33 today                           POC UDS was not performed in office today    Any new labs or imaging since last appointment? NO    Have you been to an emergency room (ER) or urgent care clinic since your last visit? NO            Have you been hospitalized since your last visit? NO     If yes, where, when, and reason for visit? Have you seen or consulted any other health care providers outside of the [address] [place] [address]  since your last visit? NO     If yes, where, when, and reason for visit? HM deferred to pcp.

## 2018-06-29 ENCOUNTER — OFFICE VISIT (OUTPATIENT)
Dept: PAIN MANAGEMENT | Age: 73
End: 2018-06-29

## 2018-06-29 VITALS
TEMPERATURE: 98 F | HEIGHT: 73 IN | HEART RATE: 60 BPM | SYSTOLIC BLOOD PRESSURE: 102 MMHG | BODY MASS INDEX: 28.1 KG/M2 | WEIGHT: 212 LBS | DIASTOLIC BLOOD PRESSURE: 53 MMHG | RESPIRATION RATE: 14 BRPM

## 2018-06-29 DIAGNOSIS — M46.1 SACROILIITIS (HCC): ICD-10-CM

## 2018-06-29 DIAGNOSIS — M51.37 DEGENERATION OF LUMBAR OR LUMBOSACRAL INTERVERTEBRAL DISC: ICD-10-CM

## 2018-06-29 DIAGNOSIS — G47.00 INSOMNIA, UNSPECIFIED TYPE: ICD-10-CM

## 2018-06-29 DIAGNOSIS — G89.29 CHRONIC LOW BACK PAIN, UNSPECIFIED BACK PAIN LATERALITY, WITH SCIATICA PRESENCE UNSPECIFIED: ICD-10-CM

## 2018-06-29 DIAGNOSIS — M54.5 CHRONIC LOW BACK PAIN, UNSPECIFIED BACK PAIN LATERALITY, WITH SCIATICA PRESENCE UNSPECIFIED: ICD-10-CM

## 2018-06-29 DIAGNOSIS — Z79.899 ENCOUNTER FOR LONG-TERM (CURRENT) USE OF HIGH-RISK MEDICATION: Primary | ICD-10-CM

## 2018-06-29 DIAGNOSIS — M15.9 GENERALIZED OA: ICD-10-CM

## 2018-06-29 LAB
ALCOHOL UR POC: NORMAL
AMPHETAMINES UR POC: NEGATIVE
BARBITURATES UR POC: NORMAL
BENZODIAZEPINES UR POC: NEGATIVE
BUPRENORPHINE UR POC: NEGATIVE
CANNABINOIDS UR POC: NEGATIVE
CARISOPRODOL UR POC: NORMAL
COCAINE UR POC: NEGATIVE
FENTANYL UR POC: NORMAL
MDMA/ECSTASY UR POC: NORMAL
METHADONE UR POC: NEGATIVE
METHAMPHETAMINE UR POC: NORMAL
METHYLPHENIDATE UR POC: NORMAL
OPIATES UR POC: NORMAL
OXYCODONE UR POC: NORMAL
PHENCYCLIDINE UR POC: NORMAL
PROPOXYPHENE UR POC: NORMAL
TRAMADOL UR POC: NORMAL
TRICYCLICS UR POC: NORMAL

## 2018-06-29 RX ORDER — ZOLPIDEM TARTRATE 5 MG/1
TABLET ORAL
Qty: 18 TAB | Refills: 0 | Status: SHIPPED | OUTPATIENT
Start: 2018-06-29 | End: 2018-12-10

## 2018-06-29 RX ORDER — MORPHINE SULFATE 30 MG/1
30 TABLET, FILM COATED, EXTENDED RELEASE ORAL EVERY 8 HOURS
Qty: 90 TAB | Refills: 0 | Status: SHIPPED | OUTPATIENT
Start: 2018-08-04 | End: 2018-09-27 | Stop reason: SDUPTHER

## 2018-06-29 RX ORDER — OXYCODONE AND ACETAMINOPHEN 10; 325 MG/1; MG/1
1 TABLET ORAL
Qty: 270 TAB | Refills: 0 | Status: SHIPPED | OUTPATIENT
Start: 2018-08-27 | End: 2018-06-29 | Stop reason: CLARIF

## 2018-06-29 RX ORDER — HYDRALAZINE HYDROCHLORIDE 25 MG/1
25 TABLET, FILM COATED ORAL 2 TIMES DAILY
COMMUNITY
End: 2019-10-28

## 2018-06-29 RX ORDER — MORPHINE SULFATE 30 MG/1
60 TABLET, FILM COATED, EXTENDED RELEASE ORAL EVERY 8 HOURS
Qty: 540 TAB | Refills: 0 | Status: SHIPPED | OUTPATIENT
Start: 2018-08-04 | End: 2018-06-29 | Stop reason: CLARIF

## 2018-06-29 RX ORDER — OXYCODONE AND ACETAMINOPHEN 10; 325 MG/1; MG/1
1 TABLET ORAL
Qty: 90 TAB | Refills: 0 | Status: SHIPPED | OUTPATIENT
Start: 2018-08-27 | End: 2018-08-27 | Stop reason: SDUPTHER

## 2018-06-29 RX ORDER — ATORVASTATIN CALCIUM 40 MG/1
40 TABLET, FILM COATED ORAL
COMMUNITY

## 2018-06-29 RX ORDER — OXYCODONE AND ACETAMINOPHEN 10; 325 MG/1; MG/1
1 TABLET ORAL
Qty: 270 TAB | Refills: 0 | Status: SHIPPED | OUTPATIENT
Start: 2018-07-28 | End: 2018-06-29 | Stop reason: CLARIF

## 2018-06-29 RX ORDER — MORPHINE SULFATE 30 MG/1
30 TABLET, FILM COATED, EXTENDED RELEASE ORAL EVERY 12 HOURS
Qty: 60 TAB | Refills: 0 | Status: SHIPPED | OUTPATIENT
Start: 2018-09-03 | End: 2018-09-27 | Stop reason: SDUPTHER

## 2018-06-29 RX ORDER — MORPHINE SULFATE 60 MG/1
60 TABLET, FILM COATED, EXTENDED RELEASE ORAL EVERY 12 HOURS
Qty: 60 TAB | Refills: 0 | Status: SHIPPED | OUTPATIENT
Start: 2018-07-05 | End: 2018-08-04

## 2018-06-29 RX ORDER — OXYCODONE AND ACETAMINOPHEN 10; 325 MG/1; MG/1
1 TABLET ORAL
Qty: 270 TAB | Refills: 0 | Status: SHIPPED | OUTPATIENT
Start: 2018-06-29 | End: 2018-06-29 | Stop reason: CLARIF

## 2018-06-29 RX ORDER — CLOPIDOGREL BISULFATE 75 MG/1
75 TABLET ORAL DAILY
COMMUNITY
End: 2019-03-21

## 2018-06-29 RX ORDER — OXYCODONE AND ACETAMINOPHEN 10; 325 MG/1; MG/1
1 TABLET ORAL
Qty: 90 TAB | Refills: 0 | Status: SHIPPED | OUTPATIENT
Start: 2018-07-28 | End: 2018-08-27

## 2018-06-29 RX ORDER — OXYCODONE AND ACETAMINOPHEN 10; 325 MG/1; MG/1
1 TABLET ORAL
Qty: 90 TAB | Refills: 0 | Status: SHIPPED | OUTPATIENT
Start: 2018-06-29 | End: 2018-07-29

## 2018-06-29 NOTE — PROGRESS NOTES
Nursing Notes    Patient presents to the office today in follow-up. Patient rates his pain at 5/10 on the numerical pain scale. Reviewed medications with counts as follows:    Rx Date filled Qty Dispensed Pill Count Last Dose Short   Percocet  mg tab 4/1/18 270 5 today no   Morphine Sulf ER  4/6/18                                      Comments:     POC UDS was performed in office today per lab order and correct read back from provider. Any new labs or imaging since last appointment? NO    Have you been to an emergency room (ER) or urgent care clinic since your last visit? NO            Have you been hospitalized since your last visit? NO     If yes, where, when, and reason for visit? Have you seen or consulted any other health care providers outside of the [address] [place] [address]  since your last visit? NO     If yes, where, when, and reason for visit? Mr. Crystal Edward has a reminder for a \"due or due soon\" health maintenance. I have asked that he contact his primary care provider for follow-up on this health maintenance.

## 2018-06-29 NOTE — PATIENT INSTRUCTIONS
1. Continue current plan with no evidence of addiction or diversion. Stable on current medication without adverse events. 2. Refill and adjust morphine ER 60 mg down to every 12 hours ×1 month, then adjust down to 30 mg every 8 hours ×1 month, then adjust down to 30 mg every 12 hours until next visit. 3. Refill oxycodone 10/325 mg up to 3 times daily as needed. 4. Begin tapering plan as discussed with Andrés. Please discuss alternative treatments for sleep with your PCP as soon as possible. Please remember that we do not treat for sleep within our practice and that we can no longer prescribe opioid medications while you are concurrently taking benzodiazepines. 5. Naloxone 4 mg nasal spray for opioid induced respiratory depression emergency only. 6. Discussed risks of addiction, dependency, and opioid induced hyperalgesia. Please remember to call at least 4-5 business days prior to your medication refill. Return to clinic in 3 months. Please call and cancel your appointment and pain management agreement if you do decide to transfer your care.

## 2018-06-29 NOTE — MR AVS SNAPSHOT
[address] [facility] [address] 
[phone] Patient: Dioni Cardenas MRN: [id number] :1945 Visit Information Date & Time Provider Department Dept. Phone Encounter #  
 2018 10:20 AM JOSEFINA Eller [facility] [id number] Follow-up Instructions Return in about 3 months (around 2018). Your Appointments 2018 10:40 AM  
Follow Up with JOSEFINA Eller [facility] (DIANA SCHEDULING) Appt Note:  @ 10:40am  
 [address] [facility] [address]  
[phone] [facility] [phone] Upcoming Health Maintenance Date Due Hepatitis C Screening 1945 DTaP/Tdap/Td series (1 - Tdap) 1968 FOBT Q 1 YEAR AGE 50-75 1997 ZOSTER VACCINE AGE 60> 2007 GLAUCOMA SCREENING Q2Y 2012 Pneumococcal 65+ Low/Medium Risk (1 of 2 - PCV13) 2012 MEDICARE YEARLY EXAM 3/14/2018 Influenza Age 5 to Adult 2018 Allergies as of 2018  Review Complete On: 2018 By: JOSEFINA Eller No Known Allergies Current Immunizations  Never Reviewed No immunizations on file. Not reviewed this visit You Were Diagnosed With   
  
 Codes Comments Generalized OA     ICD-10-CM: M15.9 ICD-9-CM: 715.00 Chronic low back pain, unspecified back pain laterality, with sciatica presence unspecified     ICD-10-CM: M54.5, G89.29 ICD-9-CM: 724.2, 338.29 Sacroiliitis ([facility] [address]     ICD-10-CM: M46.1 ICD-9-CM: 720.2 Degeneration of lumbar or lumbosacral intervertebral disc     ICD-10-CM: M51.37 
ICD-9-CM: 722.52 Insomnia, unspecified type     ICD-10-CM: G47.00 ICD-9-CM: 780.52 Vitals BP Pulse Temp Resp Height(growth percentile) Weight(growth percentile) 102/53 (BP 1 Location: Right arm, BP Patient Position: Sitting) 60 98 °F (36.7 °C) 14 6' 1\" (1.854 m) 212 lb (96.2 kg) BMI Smoking Status 27.97 kg/m2 Former Smoker Vitals History BMI and BSA Data Body Mass Index Body Surface Area  
 27.97 kg/m 2 2.23 m 2 Preferred Pharmacy Pharmacy Name Phone [facility] [address] [address], [address] [phone] Your Updated Medication List  
  
   
This list is accurate as of 6/29/18 11:19 AM.  Always use your most recent med list.  
  
  
  
  
 allopurinol 300 mg tablet Commonly known as:  Stephanie Kelly Take 300 mg by mouth daily. aspirin 325 mg tablet Commonly known as:  ASPIRIN Take 81 mg by mouth daily. atorvastatin 40 mg tablet Commonly known as:  LIPITOR Take  by mouth daily. CARVEDILOL PO Take 25 mg by mouth two (2) times a day. chlorthalidone 50 mg tablet Commonly known as:  Nathaniel Schlichter Take 1 Tab by mouth daily. cloNIDine HCl 0.1 mg tablet Commonly known as:  CATAPRES Take 0.2 mg by mouth three (3) times daily. clopidogrel 75 mg Tab Commonly known as:  PLAVIX Take  by mouth. hydrALAZINE 25 mg tablet Commonly known as:  APRESOLINE Take 25 mg by mouth three (3) times daily. ISOSORBIDE DINITRATE PO Take  by mouth. JANUVIA 25 mg tablet Generic drug:  SITagliptin Take 25 mg by mouth daily. LASIX 40 mg tablet Generic drug:  furosemide Take 20 mg by mouth as needed. * morphine CR 60 mg CR tablet Commonly known as:  MS CONTIN Take 1 Tab by mouth every twelve (12) hours for 30 days. Max Daily Amount: 120 mg. For chronic, severe pain. Indications: Chronic Pain, NEUROPATHIC PAIN, Severe Pain Start taking on:  7/5/2018 * morphine CR 30 mg CR tablet Commonly known as:  MS CONTIN  
 Take 1 Tab by mouth every eight (8) hours for 30 days. Max Daily Amount: 90 mg. For chronic, severe pain. Indications: Chronic Pain, NEUROPATHIC PAIN, Severe Pain Start taking on:  8/4/2018 * morphine CR 30 mg CR tablet Commonly known as:  MS CONTIN Take 1 Tab by mouth every twelve (12) hours for 30 days. Max Daily Amount: 60 mg. For chronic, severe pain; Early fill permitted 41/3/09 due to out of state travel  Indications: Chronic Pain, NEUROPATHIC PAIN, Severe Pain Start taking on:  9/3/2018 NITROGLYCERIN PO Take  by mouth. OTHER Lift for Right Shoe to correct leg length discrepancy * oxyCODONE-acetaminophen  mg per tablet Commonly known as:  PERCOCET Take 1 Tab by mouth three (3) times daily as needed for Pain for up to 30 days. Indications: Pain * oxyCODONE-acetaminophen  mg per tablet Commonly known as:  PERCOCET Take 1 Tab by mouth three (3) times daily as needed for Pain for up to 30 days. Indications: Pain Start taking on:  7/28/2018 * oxyCODONE-acetaminophen  mg per tablet Commonly known as:  PERCOCET Take 1 Tab by mouth three (3) times daily as needed for Pain for up to 30 days. (90 day bulk) Early fill permitted 03/6/67 due to out of state travel  Indications: Pain Start taking on:  8/27/2018 SIMVASTATIN PO Take 20 mg by mouth nightly. STAXYN 10 mg Tbdi Generic drug:  vardenafil Take  by mouth.  
  
 zolpidem 5 mg tablet Commonly known as:  AMBIEN Please take 1 tablet nightly as needed 2 weeks, then take half tablet nightly as needed 1 week * Notice: This list has 6 medication(s) that are the same as other medications prescribed for you. Read the directions carefully, and ask your doctor or other care provider to review them with you. Prescriptions Printed Refills  
 morphine CR (MS CONTIN) 60 mg CR tablet 0 Starting on: 7/5/2018 Sig: Take 1 Tab by mouth every twelve (12) hours for 30 days. Max Daily Amount: 120 mg. For chronic, severe pain. Indications: Chronic Pain, NEUROPATHIC PAIN, Severe Pain Class: Print Route: Oral  
 morphine CR (MS CONTIN) 30 mg CR tablet 0 Starting on: 9/3/2018 Sig: Take 1 Tab by mouth every twelve (12) hours for 30 days. Max Daily Amount: 60 mg. For chronic, severe pain; Early fill permitted 56/9/24 due to out of state travel  Indications: Chronic Pain, NEUROPATHIC PAIN, Severe Pain Class: Print Route: Oral  
 zolpidem (AMBIEN) 5 mg tablet 0 Sig: Please take 1 tablet nightly as needed ×2 weeks, then take half tablet nightly as needed ×1 week Class: Print  
 oxyCODONE-acetaminophen (PERCOCET)  mg per tablet 0 Sig: Take 1 Tab by mouth three (3) times daily as needed for Pain for up to 30 days. Indications: Pain Class: Print Route: Oral  
 morphine CR (MS CONTIN) 30 mg CR tablet 0 Starting on: 8/4/2018 Sig: Take 1 Tab by mouth every eight (8) hours for 30 days. Max Daily Amount: 90 mg. For chronic, severe pain. Indications: Chronic Pain, NEUROPATHIC PAIN, Severe Pain Class: Print Route: Oral  
 oxyCODONE-acetaminophen (PERCOCET)  mg per tablet 0 Starting on: 8/27/2018 Sig: Take 1 Tab by mouth three (3) times daily as needed for Pain for up to 30 days. (90 day bulk) Early fill permitted 67/7/00 due to out of state travel  Indications: Pain Class: Print Route: Oral  
 oxyCODONE-acetaminophen (PERCOCET)  mg per tablet 0 Starting on: 7/28/2018 Sig: Take 1 Tab by mouth three (3) times daily as needed for Pain for up to 30 days. Indications: Pain Class: Print Route: Oral  
  
Follow-up Instructions Return in about 3 months (around 9/29/2018). Patient Instructions 1. Continue current plan with no evidence of addiction or diversion. Stable on current medication without adverse events. 2. Refill and adjust morphine ER 60 mg down to every 12 hours ×1 month, then adjust down to 30 mg every 8 hours ×1 month, then adjust down to 30 mg every 12 hours until next visit. 3. Refill oxycodone 10/325 mg up to 3 times daily as needed. 4. Begin tapering plan as discussed with Andrés. Please discuss alternative treatments for sleep with your PCP as soon as possible. Please remember that we do not treat for sleep within our practice and that we can no longer prescribe opioid medications while you are concurrently taking benzodiazepines. 5. Naloxone 4 mg nasal spray for opioid induced respiratory depression emergency only. 6. Discussed risks of addiction, dependency, and opioid induced hyperalgesia. Please remember to call at least 4-5 business days prior to your medication refill. Return to clinic in 3 months. Please call and cancel your appointment and pain management agreement if you do decide to transfer your care. Introducing [facility] SERVICES! Spencer Kirkpatrick introduces [employer] patient portal. Now you can access parts of your medical record, email your doctor's office, and request medication refills online. 1. In your internet browser, go to [email or web address] 2. Click on the First Time User? Click Here link in the Sign In box. You will see the New Member Sign Up page. 3. Enter your [employer] Access Code exactly as it appears below. You will not need to use this code after youve completed the sign-up process. If you do not sign up before the expiration date, you must request a new code. · [employer] Access Code: [email or web address] Expires: 9/27/2018 11:17 AM 
 
4. Enter the last four digits of your Social Security Number (xxxx) and Date of Birth (mm/dd/yyyy) as indicated and click Submit. You will be taken to the next sign-up page. 5. Create a [employer] ID. This will be your [employer] login ID and cannot be changed, so think of one that is secure and easy to remember. 6. Create a [employer] password. You can change your password at any time. 7. Enter your Password Reset Question and Answer. This can be used at a later time if you forget your password. 8. Enter your e-mail address. You will receive e-mail notification when new information is available in [address] 9. Click Sign Up. You can now view and download portions of your medical record. 10. Click the Download Summary menu link to download a portable copy of your medical information. If you have questions, please visit the Frequently Asked Questions section of the [employer] website. Remember, [employer] is NOT to be used for urgent needs. For medical emergencies, dial 911. Now available from your iPhone and Android! Please provide this summary of care documentation to your next provider. Your primary care clinician is listed as [address] [facility] [address] [facility]. If you have any questions after today's visit, please call [phone].

## 2018-06-29 NOTE — PROGRESS NOTES
Nursing Notes    Patient presents to the office today in follow-up. Patient rates his pain at 5/10 on the numerical pain scale. Reviewed medications with counts as follows:    Rx Date filled Qty Dispensed Pill Count Last Dose Short   Ms Contin Er 60 mg  04/06/18 270 20 This am  no   ambien 10 mg 04/01/18 90 2 Last pm no   Percocet 10 MG 04/01/18 270 5 This am  no       Comments: Patient is here today for a follow up appt today he states his pain level today is a 5  He states he has pills in his pill counter at home  PHQ 9 was done patient denies any depression     POC UDS was performed in office today per verbal order per [facility]    Any new labs or imaging since last appointment? NO    Have you been to an emergency room (ER) or urgent care clinic since your last visit? NO            Have you been hospitalized since your last visit? NO     If yes, where, when, and reason for visit? Have you seen or consulted any other health care providers outside of the [address] [place] [address]  since your last visit? YES     If yes, where, when, and reason for visit? Mr. Clau Clement has a reminder for a \"due or due soon\" health maintenance. I have asked that he contact his primary care provider for follow-up on this health maintenance.

## 2018-06-29 NOTE — PROGRESS NOTES
HISTORY OF PRESENT ILLNESS  Amrita Nunez is a 81 y.o. male    HPI: Mr. Marc Guadalupe  returns today for f/u of chronic, severe pain due to generalized osteoarthritis. He also suffers from chronic cervical and lumbar pain related to underlying spondylosis and degenerative disc disease. Mr. Marc Guadalupe continues unchanged since last visit. He continues to do very well with his current treatment plan which has been offering significant pain control. Reports no new complaints today. We spent most of today's visit discussing changes to our practice. I explained to him that moving forward we will be focusing on more conservative and non-opioid plan of care. This will result in changes to his current treatment plan. We will begin reducing his morphine today. Please see tapering plan below. We will continue with his oxycodone unchanged but will begin tapering this later. He  has expresses concerns with these changes and states that he most likely will transfer his care but has not made a formal decision at this time. I have asked him to call and cancel his appointment and pain management agreement if he does decide to transfer his care. Medication management includes morphine ER 60 mg every 8 hours and oxycodone 10/325 mg 3 times daily as needed. medications are helping with pain control and quality of life. His pain is 5-7/10 with medication and 10/10 without. Pt describes pain as aching, stiff, and stabbing. Aggravating factors include standing and walking. Relieved with rest, medication, and avoiding painful activities. Current treatment is helping to improve general activity, mood, walking, sleep, enjoyment of life    Mr. Marc Guadalupe is tolerating medications well, with no side effects noted. He is able to stay more active with less discomfort with these current doses. The patient reports an average of 30-50% pain relief with current treatment/medications.   He is informed of side effects, risks, and benefits of this regimen, and emphasizes that he derives a significant improvement in functionality and quality of life, and notes that non-opioid medications and therapies in the past have not offered significant benefit. Pt does report constipation but is well controlled with OTC medication  He  is otherwise doing well with no other complaints today. He denies any adverse events including nausea, vomiting, dizziness, increased constipation, hallucinations, or seizures. Because the patient's current regimen places him/her at increased risk for possible overdose, a prescription for naloxone nasal spray has been provided. The patient understands that this medication is only to be used in the setting of a possible overdose and that inadvertent use of this medication could precipitate overt withdrawal.    MME: 125  COMM: 1  OSWESTRY: 54 %  POC UDS today. Confirmation pending. No Known Allergies    Past Surgical History:   Procedure Laterality Date    CARDIAC SURG PROCEDURE UNLIST  5/20/16    quad bypass    HX CATARACT REMOVAL Right 10/17/2016    HX CORONARY ARTERY BYPASS GRAFT       4 vessel CABG    HX CORONARY ARTERY BYPASS GRAFT  05/2016    HX GASTRIC BYPASS      HX HEENT      rt eye laser surgery    HX ORTHOPAEDIC      rt knee    LEFT HEART PERCUTANEOUS  6/1/2017              Review of Systems   Constitutional: Positive for malaise/fatigue. Negative for chills, fever and weight loss. HENT: Negative for congestion and sore throat. Eyes: Negative for blurred vision and double vision. Respiratory: Negative for cough, shortness of breath and wheezing. Cardiovascular: Positive for leg swelling. Negative for chest pain and palpitations. Gastrointestinal: Positive for constipation. Negative for abdominal pain, diarrhea, heartburn, nausea and vomiting. Genitourinary: Negative. Musculoskeletal: Positive for back pain, joint pain, myalgias and neck pain.    Neurological: Negative for dizziness, seizures and loss of consciousness. Endo/Heme/Allergies: Does not bruise/bleed easily. Psychiatric/Behavioral: Negative for depression. The patient has insomnia. The patient is not nervous/anxious. All other systems reviewed and are negative. Physical Exam   Constitutional: He is oriented to person, place, and time and well-developed, well-nourished, and in no distress. No distress. HENT:   Head: Normocephalic and atraumatic. Eyes: EOM are normal.   Pulmonary/Chest: Effort normal.   Neurological: He is alert and oriented to person, place, and time. Skin: Skin is dry. No rash noted. No erythema. Psychiatric: Mood, memory, affect and judgment normal.   Nursing note and vitals reviewed. ASSESSMENT:    1. Generalized OA    2. Chronic low back pain, unspecified back pain laterality, with sciatica presence unspecified    3. Sacroiliitis ([facility] [address]    4. Degeneration of lumbar or lumbosacral intervertebral disc    5. Insomnia, unspecified type           [place] Prescription Monitoring Program was reviewed which does not demonstrate aberrancies and/or inconsistencies with regard to the historical prescribing of controlled medications to this patient by other providers. PLAN / Pt Instructions:  1. Continue current plan with no evidence of addiction or diversion. Stable on current medication without adverse events. 2. Refill and adjust morphine ER 60 mg down to every 12 hours ×1 month, then adjust down to 30 mg every 8 hours ×1 month, then adjust down to 30 mg every 12 hours until next visit. 3. Refill oxycodone 10/325 mg up to 3 times daily as needed. 4. Begin tapering plan as discussed with Andrés. Please discuss alternative treatments for sleep with your PCP as soon as possible. Please remember that we do not treat for sleep within our practice and that we can no longer prescribe opioid medications while you are concurrently taking benzodiazepines.   5. Naloxone 4 mg nasal spray for opioid induced respiratory depression emergency only. 6. Discussed risks of addiction, dependency, and opioid induced hyperalgesia. Please remember to call at least 4-5 business days prior to your medication refill. Return to clinic in 3 months. Please call and cancel your appointment and pain management agreement if you do decide to transfer your care. Medications Ordered Today   Medications    morphine CR (MS CONTIN) 60 mg CR tablet     Sig: Take 1 Tab by mouth every twelve (12) hours for 30 days. Max Daily Amount: 120 mg. For chronic, severe pain. Indications: Chronic Pain, NEUROPATHIC PAIN, Severe Pain     Dispense:  60 Tab     Refill:  0    DISCONTD: oxyCODONE-acetaminophen (PERCOCET)  mg per tablet     Sig: Take 1 Tab by mouth three (3) times daily as needed for Pain for up to 90 days. Indications: Pain     Dispense:  270 Tab     Refill:  0    DISCONTD: morphine CR (MS CONTIN) 30 mg CR tablet     Sig: Take 2 Tabs by mouth every eight (8) hours for 90 days. Max Daily Amount: 180 mg. For chronic, severe pain. Indications: Chronic Pain, NEUROPATHIC PAIN, Severe Pain     Dispense:  540 Tab     Refill:  0    DISCONTD: oxyCODONE-acetaminophen (PERCOCET)  mg per tablet     Sig: Take 1 Tab by mouth three (3) times daily as needed for Pain for up to 90 days. Indications: Pain     Dispense:  270 Tab     Refill:  0    morphine CR (MS CONTIN) 30 mg CR tablet     Sig: Take 1 Tab by mouth every twelve (12) hours for 30 days. Max Daily Amount: 60 mg. For chronic, severe pain; Early fill permitted 69/3/14 due to out of state travel  Indications: Chronic Pain, NEUROPATHIC PAIN, Severe Pain     Dispense:  60 Tab     Refill:  0    DISCONTD: oxyCODONE-acetaminophen (PERCOCET)  mg per tablet     Sig: Take 1 Tab by mouth three (3) times daily as needed for Pain for up to 90 days.  (90 day bulk) Early fill permitted 03/5/53 due to out of state travel  Indications: Pain     Dispense:  270 Tab     Refill:  0    zolpidem (AMBIEN) 5 mg tablet     Sig: Please take 1 tablet nightly as needed ×2 weeks, then take half tablet nightly as needed ×1 week     Dispense:  18 Tab     Refill:  0    oxyCODONE-acetaminophen (PERCOCET)  mg per tablet     Sig: Take 1 Tab by mouth three (3) times daily as needed for Pain for up to 30 days. Indications: Pain     Dispense:  90 Tab     Refill:  0    morphine CR (MS CONTIN) 30 mg CR tablet     Sig: Take 1 Tab by mouth every eight (8) hours for 30 days. Max Daily Amount: 90 mg. For chronic, severe pain. Indications: Chronic Pain, NEUROPATHIC PAIN, Severe Pain     Dispense:  90 Tab     Refill:  0    oxyCODONE-acetaminophen (PERCOCET)  mg per tablet     Sig: Take 1 Tab by mouth three (3) times daily as needed for Pain for up to 30 days. (90 day bulk) Early fill permitted 01/3/51 due to out of state travel  Indications: Pain     Dispense:  90 Tab     Refill:  0    oxyCODONE-acetaminophen (PERCOCET)  mg per tablet     Sig: Take 1 Tab by mouth three (3) times daily as needed for Pain for up to 30 days. Indications: Pain     Dispense:  90 Tab     Refill:  0         DISPOSITION     Pain medications are prescribed with the objective of pain relief and improved physical and psychosocial function in this patient.  Pain Meds and Quality Of Life have been reviewed. Nonpharmacologic therapy and non-opioid pharmacologic therapy have been considered. Opioid therapy is only prescribed if the expected benefits are anticipated to outweigh risks.  Counseled patient on proper use of prescribed medications.  Reviewed with patient self-help tools, home exercise, and lifestyle changes to assist the patient in self-management of symptoms.  Reviewed with patient the treatment plan, goals of treatment plan, and limitations of treatment plan, to include the potential for side effects from medications and procedures.   If side effects occur, it is the responsibility of the patient to inform the clinic so that a change in the treatment plan can be made in a safe manner. The patient is advised that stopping prescribed medication may cause an increase in symptoms and possible medication withdrawal symptoms. The patient is informed an emergency room evaluation may be necessary if this occurs. Spent 25 minutes with patient today which more than 50% of that time was spent on counseling and coordination of care. Brian Driver [facility] 6/29/2018        Note: Please excuse any typographical errors. Voice recognition software was used for this note and may cause mistakes.

## 2018-07-31 ENCOUNTER — CLINICAL SUPPORT (OUTPATIENT)
Dept: PAIN MANAGEMENT | Age: 73
End: 2018-07-31

## 2018-07-31 VITALS
RESPIRATION RATE: 14 BRPM | OXYGEN SATURATION: 99 % | DIASTOLIC BLOOD PRESSURE: 71 MMHG | HEART RATE: 45 BPM | BODY MASS INDEX: 28.1 KG/M2 | SYSTOLIC BLOOD PRESSURE: 133 MMHG | HEIGHT: 73 IN | WEIGHT: 212 LBS | TEMPERATURE: 96.8 F

## 2018-07-31 DIAGNOSIS — G89.29 CHRONIC LOW BACK PAIN, UNSPECIFIED BACK PAIN LATERALITY, WITH SCIATICA PRESENCE UNSPECIFIED: Primary | ICD-10-CM

## 2018-07-31 DIAGNOSIS — M54.5 CHRONIC LOW BACK PAIN, UNSPECIFIED BACK PAIN LATERALITY, WITH SCIATICA PRESENCE UNSPECIFIED: Primary | ICD-10-CM

## 2018-07-31 NOTE — PROGRESS NOTES
Nursing Notes    Patient's pulse was 48. Patient asymptomatic and is on BP medication and Plavix. Patient presents to the office today in follow-up. Patient rates his pain at 7/10 on the numerical pain scale. Reviewed medications with counts as follows:    Rx Date filled Qty Dispensed Pill Count Last Dose Short   Percocet  mg tab 6/30/18 90 0 today no   Morphine Sulf ER 60 mg tab 7/5/18 60 7 today no                              PHQ over the last two weeks 7/31/2018   Little interest or pleasure in doing things Not at all   Feeling down, depressed, irritable, or hopeless Not at all   Total Score PHQ 2 0       Comments:     POC UDS was not performed in office today    Any new labs or imaging since last appointment? NO    Have you been to an emergency room (ER) or urgent care clinic since your last visit? NO            Have you been hospitalized since your last visit? NO     If yes, where, when, and reason for visit? Have you seen or consulted any other health care providers outside of the [address] [place] [address]  since your last visit? YES, Nephrologist.     If yes, where, when, and reason for visit? Mr. Qiana Spencer has a reminder for a \"due or due soon\" health maintenance. I have asked that he contact his primary care provider for follow-up on this health maintenance. Patient identity verified using 2 patient identifiers. Group education topic covered:  Nurse Visit    Questions regarding group topic? None     Aberrances present on  report pulled? ( If so, please describe.)  no    Patient contact number: [phone]    What percentage of relief does the pain medication(s) provide? (Between 0-100) 50% pain relief reported by patient. Are you able to perform your daily tasks when taking your medication? (Such as: Personal Hygiene, Cooking, Cleaning) : yes    Are you having any side effects from your medication? (Ex; nausea, vomiting,constipation) no  What relieves the side effects? N/A    Questions regarding one one one discussion?  None

## 2018-08-27 DIAGNOSIS — Z79.899 ENCOUNTER FOR LONG-TERM (CURRENT) USE OF HIGH-RISK MEDICATION: ICD-10-CM

## 2018-08-27 DIAGNOSIS — G89.29 CHRONIC LOW BACK PAIN, UNSPECIFIED BACK PAIN LATERALITY, WITH SCIATICA PRESENCE UNSPECIFIED: ICD-10-CM

## 2018-08-27 DIAGNOSIS — M51.37 DEGENERATION OF LUMBAR OR LUMBOSACRAL INTERVERTEBRAL DISC: ICD-10-CM

## 2018-08-27 DIAGNOSIS — G47.00 INSOMNIA, UNSPECIFIED TYPE: ICD-10-CM

## 2018-08-27 DIAGNOSIS — M54.5 CHRONIC LOW BACK PAIN, UNSPECIFIED BACK PAIN LATERALITY, WITH SCIATICA PRESENCE UNSPECIFIED: ICD-10-CM

## 2018-08-27 DIAGNOSIS — M46.1 SACROILIITIS (HCC): ICD-10-CM

## 2018-08-27 DIAGNOSIS — M15.9 GENERALIZED OA: ICD-10-CM

## 2018-08-27 RX ORDER — OXYCODONE AND ACETAMINOPHEN 10; 325 MG/1; MG/1
1 TABLET ORAL
Qty: 90 TAB | Refills: 0 | Status: SHIPPED | OUTPATIENT
Start: 2018-08-29 | End: 2018-09-27 | Stop reason: SDUPTHER

## 2018-08-27 NOTE — TELEPHONE ENCOUNTER
Daphnie Castañeda has called requesting a refill of their controlled medication, morphine sulfate ER and percocet, for the management of his chronic back pain. Last office visit date: 06/29/18    Date last  was pulled and reviewed : 08/27/18, last fill date for morphine sulfate ER and percocet was 07/31/18. Was the patient compliant when the above report was pulled? yes    Analgesia: pt reports a 50% pain relief with his current opioid medication regimen    Aberrancies: none noted     ADL's: pt reports being able to perform normal daily duties while taking his medication     Adverse Reaction: none reported by the pt at this time. Provider's last note and plan of care reviewed? Yes, pre-printed prescriptions. Request forwarded to provider for review.

## 2018-09-27 ENCOUNTER — OFFICE VISIT (OUTPATIENT)
Dept: PAIN MANAGEMENT | Age: 73
End: 2018-09-27

## 2018-09-27 VITALS
WEIGHT: 205 LBS | HEIGHT: 73 IN | RESPIRATION RATE: 18 BRPM | TEMPERATURE: 97.9 F | BODY MASS INDEX: 27.17 KG/M2 | DIASTOLIC BLOOD PRESSURE: 71 MMHG | SYSTOLIC BLOOD PRESSURE: 105 MMHG | HEART RATE: 104 BPM

## 2018-09-27 DIAGNOSIS — G89.29 CHRONIC LOW BACK PAIN, UNSPECIFIED BACK PAIN LATERALITY, WITH SCIATICA PRESENCE UNSPECIFIED: ICD-10-CM

## 2018-09-27 DIAGNOSIS — M54.5 CHRONIC LOW BACK PAIN, UNSPECIFIED BACK PAIN LATERALITY, WITH SCIATICA PRESENCE UNSPECIFIED: ICD-10-CM

## 2018-09-27 DIAGNOSIS — M46.1 SACROILIITIS (HCC): ICD-10-CM

## 2018-09-27 DIAGNOSIS — Z79.899 ENCOUNTER FOR LONG-TERM (CURRENT) USE OF HIGH-RISK MEDICATION: ICD-10-CM

## 2018-09-27 DIAGNOSIS — G47.00 INSOMNIA, UNSPECIFIED TYPE: ICD-10-CM

## 2018-09-27 DIAGNOSIS — M15.9 GENERALIZED OA: ICD-10-CM

## 2018-09-27 DIAGNOSIS — M51.37 DEGENERATION OF LUMBAR OR LUMBOSACRAL INTERVERTEBRAL DISC: ICD-10-CM

## 2018-09-27 RX ORDER — MORPHINE SULFATE 30 MG/1
30 TABLET, FILM COATED, EXTENDED RELEASE ORAL EVERY 12 HOURS
Qty: 60 TAB | Refills: 0 | Status: SHIPPED | OUTPATIENT
Start: 2018-10-02 | End: 2018-11-01

## 2018-09-27 RX ORDER — MORPHINE SULFATE 15 MG/1
15 TABLET, FILM COATED, EXTENDED RELEASE ORAL EVERY 12 HOURS
Qty: 60 TAB | Refills: 0 | Status: SHIPPED | OUTPATIENT
Start: 2018-11-30 | End: 2018-12-10 | Stop reason: SDUPTHER

## 2018-09-27 RX ORDER — OXYCODONE AND ACETAMINOPHEN 10; 325 MG/1; MG/1
1 TABLET ORAL
Qty: 120 TAB | Refills: 0 | Status: SHIPPED | OUTPATIENT
Start: 2018-11-26 | End: 2018-12-26

## 2018-09-27 RX ORDER — OXYCODONE AND ACETAMINOPHEN 10; 325 MG/1; MG/1
1 TABLET ORAL
Qty: 90 TAB | Refills: 0 | Status: SHIPPED | OUTPATIENT
Start: 2018-09-28 | End: 2018-10-28

## 2018-09-27 RX ORDER — MORPHINE SULFATE 15 MG/1
15 TABLET, FILM COATED, EXTENDED RELEASE ORAL EVERY 8 HOURS
Qty: 90 TAB | Refills: 0 | Status: SHIPPED | OUTPATIENT
Start: 2018-11-01 | End: 2018-12-01

## 2018-09-27 RX ORDER — OXYCODONE AND ACETAMINOPHEN 10; 325 MG/1; MG/1
1 TABLET ORAL
Qty: 120 TAB | Refills: 0 | Status: SHIPPED | OUTPATIENT
Start: 2018-10-27 | End: 2018-12-10 | Stop reason: SDUPTHER

## 2018-09-27 NOTE — PROGRESS NOTES
Nursing Notes    Patient presents to the office today in follow-up. Patient rates his pain at 5/10 on the numerical pain scale. Reviewed medications with counts as follows:    Rx Date filled Qty Dispensed Pill Count Last Dose Short   Morphine ER 30 mg 09/03/18 60 12 This am no   Percocet 10 mg 08/29/18 90 5 This am  no       Last opioid agreement 12/19/17   Last urine drug screen 06/29/18     Comments: Patient is here today for a follow up appt today he states his pain level today is a 5   PHQ 9 was done patient denies any depression. POC UDS was not performed in office today    Any new labs or imaging since last appointment? NO    Have you been to an emergency room (ER) or urgent care clinic since your last visit? NO            Have you been hospitalized since your last visit? NO     If yes, where, when, and reason for visit? Have you seen or consulted any other health care providers outside of the [address] [place] [address]  since your last visit? NO     If yes, where, when, and reason for visit? Mr. Akhil Padilla has a reminder for a \"due or due soon\" health maintenance. I have asked that he contact his primary care provider for follow-up on this health maintenance.

## 2018-09-27 NOTE — PROGRESS NOTES
HISTORY OF PRESENT ILLNESS  Angelique Meyer is a 81 y.o. male    HPI: Mr. Radhames Tellez  returns today for f/u of chronic, severe pain due to generalized osteoarthritis. He also suffers from chronic cervical and lumbar pain related to underlying spondylosis and degenerative disc disease. Mr. Radhames Tellez continues to do well with his current treatment plan which has been offering significant pain control. He reports no significant changes since his last visit. We did have a lengthy conversation last visit regarding changes to our practice. We began tapering his morphine ER and will continue tapering as previously discussed. I have agreed to adjust his oxycodone up to 4 times daily to help compensate and slow down the tapering process. Please see tapering plan below. He is otherwise doing well with no other complaints today. We will continue to work on a conservative and comprehensive plan of care moving forward. He has expressed interest in transferring his care as well but has not made a formal decision at this time. I have asked that he call and cancel his appointment and pain management agreement if he does decide to transfer his care. Medication management includes morphine ER 30 mg every 12 hours and oxycodone 10/325 mg 3 times daily as needed. medications are helping with pain control and quality of life. His pain is 5-7/10 with medication and 10/10 without. Pt describes pain as aching, stiff, and stabbing. Aggravating factors include standing and walking. Relieved with rest, medication, and avoiding painful activities. Current treatment is helping to improve general activity, mood, walking, sleep, enjoyment of life    Mr. Radhames Tellez is tolerating medications well, with no side effects noted. He is able to stay more active with less discomfort with these current doses. The patient reports an average of 30-50% pain relief with current treatment/medications.   He is informed of side effects, risks, and benefits of this regimen, and emphasizes that he derives a significant improvement in functionality and quality of life, and notes that non-opioid medications and therapies in the past have not offered significant benefit. Pt does report constipation but is well controlled with OTC medication  He  is otherwise doing well with no other complaints today. He denies any adverse events including nausea, vomiting, dizziness, increased constipation, hallucinations, or seizures. Because the patient's current regimen places him/her at increased risk for possible overdose, a prescription for naloxone nasal spray has been provided. The patient understands that this medication is only to be used in the setting of a possible overdose and that inadvertent use of this medication could precipitate overt withdrawal.    MME: 105  COMM: 1  OSWESTRY: 54 %  Last UDS reviewed       No Known Allergies    Past Surgical History:   Procedure Laterality Date    CARDIAC SURG PROCEDURE UNLIST  5/20/16    quad bypass    HX CATARACT REMOVAL Right 10/17/2016    HX CORONARY ARTERY BYPASS GRAFT       4 vessel CABG    HX CORONARY ARTERY BYPASS GRAFT  05/2016    HX GASTRIC BYPASS      HX HEENT      rt eye laser surgery    HX ORTHOPAEDIC      rt knee    LEFT HEART PERCUTANEOUS  6/1/2017              Review of Systems   Constitutional: Positive for malaise/fatigue. Negative for chills, fever and weight loss. HENT: Negative for congestion and sore throat. Eyes: Negative for blurred vision and double vision. Respiratory: Negative for cough, shortness of breath and wheezing. Cardiovascular: Positive for leg swelling. Negative for chest pain and palpitations. Gastrointestinal: Positive for constipation. Negative for abdominal pain, diarrhea, heartburn, nausea and vomiting. Genitourinary: Negative. Musculoskeletal: Positive for back pain, joint pain, myalgias and neck pain.    Neurological: Negative for dizziness, seizures and loss of consciousness. Endo/Heme/Allergies: Does not bruise/bleed easily. Psychiatric/Behavioral: Negative for depression. The patient has insomnia. The patient is not nervous/anxious. All other systems reviewed and are negative. Physical Exam   Constitutional: He is oriented to person, place, and time and well-developed, well-nourished, and in no distress. No distress. HENT:   Head: Normocephalic and atraumatic. Eyes: EOM are normal.   Pulmonary/Chest: Effort normal.   Neurological: He is alert and oriented to person, place, and time. Skin: Skin is dry. No rash noted. No erythema. Psychiatric: Mood, memory, affect and judgment normal.   Nursing note and vitals reviewed. ASSESSMENT:    1. Generalized OA    2. Chronic low back pain, unspecified back pain laterality, with sciatica presence unspecified    3. Sacroiliitis ([address]    4. Degeneration of lumbar or lumbosacral intervertebral disc    5. Insomnia, unspecified type    6. Encounter for long-term (current) use of high-risk medication           [place] Prescription Monitoring Program was reviewed which does not demonstrate aberrancies and/or inconsistencies with regard to the historical prescribing of controlled medications to this patient by other providers. PLAN / Pt Instructions:  1. Modify current plan with no evidence of addiction or diversion. Stable on current medication without adverse events. 2. Refill morphine ER 30 mg every 12 hours x 1 month, then adjust down to 15 mg every 8 hours x 1 month, then adjust down to 15 mg every 12 hours until next visit. 3. Refill oxycodone 10/325 mg up to 3 times daily as needed times 1 month, then adjust up to 4 times daily as needed until next visit. 4. Please remember to discuss further sleep treatment with your PCP. 5. Naloxone 4 mg nasal spray for opioid induced respiratory depression emergency only. 6. Discussed risks of addiction, dependency, and opioid induced hyperalgesia.    Please remember to call at least 5 business days prior to your medication refill. Return to clinic in 3 months. Please call and cancel your appointment and pain management agreement if you do decide to transfer your care. Medications Ordered Today   Medications    morphine CR (MS CONTIN) 30 mg CR tablet     Sig: Take 1 Tab by mouth every twelve (12) hours for 30 days. Max Daily Amount: 60 mg. For chronic, severe pain; Early fill permitted 17/2/35 due to out of state travel  Indications: Chronic Pain, NEUROPATHIC PAIN, Severe Pain     Dispense:  60 Tab     Refill:  0    oxyCODONE-acetaminophen (PERCOCET)  mg per tablet     Sig: Take 1 Tab by mouth three (3) times daily as needed for Pain for up to 30 days. Indications: Pain     Dispense:  90 Tab     Refill:  0    morphine CR (MS CONTIN) 15 mg CR tablet     Sig: Take 1 Tab by mouth every eight (8) hours for 30 days. Max Daily Amount: 45 mg. For chronic, severe pain. Indications: Chronic Pain, NEUROPATHIC PAIN, Severe Pain     Dispense:  90 Tab     Refill:  0    morphine CR (MS CONTIN) 15 mg CR tablet     Sig: Take 1 Tab by mouth every twelve (12) hours for 90 days. Max Daily Amount: 30 mg. For chronic, severe pain. Indications: Chronic Pain, NEUROPATHIC PAIN, Severe Pain     Dispense:  60 Tab     Refill:  0    oxyCODONE-acetaminophen (PERCOCET)  mg per tablet     Sig: Take 1 Tab by mouth four (4) times daily as needed for Pain for up to 90 days. Max Daily Amount: 4 Tabs. Indications: Pain     Dispense:  120 Tab     Refill:  0    oxyCODONE-acetaminophen (PERCOCET)  mg per tablet     Sig: Take 1 Tab by mouth four (4) times daily as needed for Pain for up to 30 days. Max Daily Amount: 4 Tabs. Indications: Pain     Dispense:  120 Tab     Refill:  0       DISPOSITION     Pain medications are prescribed with the objective of pain relief and improved physical and psychosocial function in this patient.     Pain Meds and Quality Of Life have been reviewed. Nonpharmacologic therapy and non-opioid pharmacologic therapy have been considered. Opioid therapy is only prescribed if the expected benefits are anticipated to outweigh risks.  Counseled patient on proper use of prescribed medications.  Reviewed with patient self-help tools, home exercise, and lifestyle changes to assist the patient in self-management of symptoms.  Reviewed with patient the treatment plan, goals of treatment plan, and limitations of treatment plan, to include the potential for side effects from medications and procedures. If side effects occur, it is the responsibility of the patient to inform the clinic so that a change in the treatment plan can be made in a safe manner. The patient is advised that stopping prescribed medication may cause an increase in symptoms and possible medication withdrawal symptoms. The patient is informed an emergency room evaluation may be necessary if this occurs. Spent 25 minutes with patient today which more than 50% of that time was spent on counseling and coordination of care. Jorja Crigler, [place] 9/27/2018        Note: Please excuse any typographical errors. Voice recognition software was used for this note and may cause mistakes.

## 2018-09-27 NOTE — MR AVS SNAPSHOT
[address] [place] [address] 
[phone] Patient: Brandon Brian MRN: [id number] :1945 Visit Information Date & Time Provider Department Dept. Phone Encounter #  
 2018 10:40 AM Kvng Ta [facility] [id number] Follow-up Instructions Return in about 3 months (around 2018). Your Appointments 2018 10:40 AM  
Follow Up with JOSEFINA Costello [facility] (DIANA SCHEDULING) Appt Note:  @ 10:40am  
 [address] [place] [address]  
[phone] [facility] [id number] Upcoming Health Maintenance Date Due Hepatitis C Screening 1945 DTaP/Tdap/Td series (1 - Tdap) 1968 Shingrix Vaccine Age 50> (1 of 2) 1997 FOBT Q 1 YEAR AGE 50-75 1997 GLAUCOMA SCREENING Q2Y 2012 Pneumococcal 65+ Low/Medium Risk (1 of 2 - PCV13) 2012 MEDICARE YEARLY EXAM 3/14/2018 Influenza Age 5 to Adult 2018 Allergies as of 2018  Review Complete On: 2018 By: Sarah Domingo LPN No Known Allergies Current Immunizations  Never Reviewed No immunizations on file. Not reviewed this visit You Were Diagnosed With   
  
 Codes Comments Generalized OA     ICD-10-CM: M15.9 ICD-9-CM: 715.00 Chronic low back pain, unspecified back pain laterality, with sciatica presence unspecified     ICD-10-CM: M54.5, G89.29 ICD-9-CM: 724.2, 338.29 Sacroiliitis ([facility] [address]     ICD-10-CM: M46.1 ICD-9-CM: 720.2 Degeneration of lumbar or lumbosacral intervertebral disc     ICD-10-CM: M51.37 
ICD-9-CM: 722.52 Insomnia, unspecified type     ICD-10-CM: G47.00 ICD-9-CM: 780.52 Encounter for long-term (current) use of high-risk medication     ICD-10-CM: Z79.899 ICD-9-CM: V58.69 Vitals BP Pulse Temp Resp Height(growth percentile) Weight(growth percentile) 105/71 (BP 1 Location: Left arm, BP Patient Position: Sitting) (!) 104 97.9 °F (36.6 °C) 18 6' 1\" (1.854 m) 205 lb (93 kg) BMI Smoking Status 27.05 kg/m2 Former Smoker BMI and BSA Data Body Mass Index Body Surface Area  
 27.05 kg/m 2 2.19 m 2 Preferred Pharmacy Pharmacy Name Phone [facility] [address], [address] [phone] Your Updated Medication List  
  
   
This list is accurate as of 9/27/18 10:38 AM.  Always use your most recent med list.  
  
  
  
  
 allopurinol 300 mg tablet Commonly known as:  Shilpi Huh Take 300 mg by mouth daily. aspirin 325 mg tablet Commonly known as:  ASPIRIN Take 81 mg by mouth daily. atorvastatin 40 mg tablet Commonly known as:  LIPITOR Take  by mouth daily. CARVEDILOL PO Take 25 mg by mouth two (2) times a day. chlorthalidone 50 mg tablet Commonly known as:  Alma Campi Take 1 Tab by mouth daily. cloNIDine HCl 0.1 mg tablet Commonly known as:  CATAPRES Take 0.2 mg by mouth three (3) times daily. clopidogrel 75 mg Tab Commonly known as:  PLAVIX Take  by mouth. hydrALAZINE 25 mg tablet Commonly known as:  APRESOLINE Take 25 mg by mouth three (3) times daily. ISOSORBIDE DINITRATE PO Take  by mouth. JANUVIA 25 mg tablet Generic drug:  SITagliptin Take 25 mg by mouth daily. LASIX 40 mg tablet Generic drug:  furosemide Take 20 mg by mouth as needed. * morphine CR 30 mg CR tablet Commonly known as:  MS CONTIN Take 1 Tab by mouth every twelve (12) hours for 30 days. Max Daily Amount: 60 mg. For chronic, severe pain; Early fill permitted 93/7/52 due to out of state travel  Indications: Chronic Pain, NEUROPATHIC PAIN, Severe Pain Start taking on:  10/2/2018 * morphine CR 15 mg CR tablet Commonly known as:  MS CONTIN Take 1 Tab by mouth every eight (8) hours for 30 days. Max Daily Amount: 45 mg. For chronic, severe pain. Indications: Chronic Pain, NEUROPATHIC PAIN, Severe Pain Start taking on:  11/1/2018 * morphine CR 15 mg CR tablet Commonly known as:  MS CONTIN Take 1 Tab by mouth every twelve (12) hours for 90 days. Max Daily Amount: 30 mg. For chronic, severe pain. Indications: Chronic Pain, NEUROPATHIC PAIN, Severe Pain Start taking on:  11/30/2018 NITROGLYCERIN PO Take  by mouth. OTHER Lift for Right Shoe to correct leg length discrepancy * oxyCODONE-acetaminophen  mg per tablet Commonly known as:  PERCOCET Take 1 Tab by mouth three (3) times daily as needed for Pain for up to 30 days. Indications: Pain Start taking on:  9/28/2018 * oxyCODONE-acetaminophen  mg per tablet Commonly known as:  PERCOCET Take 1 Tab by mouth four (4) times daily as needed for Pain for up to 90 days. Max Daily Amount: 4 Tabs. Indications: Pain Start taking on:  10/27/2018 * oxyCODONE-acetaminophen  mg per tablet Commonly known as:  PERCOCET Take 1 Tab by mouth four (4) times daily as needed for Pain for up to 30 days. Max Daily Amount: 4 Tabs. Indications: Pain Start taking on:  11/26/2018 SIMVASTATIN PO Take 20 mg by mouth nightly. STAXYN 10 mg Tbdi Generic drug:  vardenafil Take  by mouth.  
  
 zolpidem 5 mg tablet Commonly known as:  AMBIEN Please take 1 tablet nightly as needed 2 weeks, then take half tablet nightly as needed 1 week * Notice: This list has 6 medication(s) that are the same as other medications prescribed for you. Read the directions carefully, and ask your doctor or other care provider to review them with you. Prescriptions Printed  Refills  
 morphine CR (MS CONTIN) 30 mg CR tablet 0  
 Starting on: 10/2/2018 Sig: Take 1 Tab by mouth every twelve (12) hours for 30 days. Max Daily Amount: 60 mg. For chronic, severe pain; Early fill permitted 43/0/37 due to out of state travel  Indications: Chronic Pain, NEUROPATHIC PAIN, Severe Pain Class: Print Route: Oral  
 oxyCODONE-acetaminophen (PERCOCET)  mg per tablet 0 Starting on: 9/28/2018 Sig: Take 1 Tab by mouth three (3) times daily as needed for Pain for up to 30 days. Indications: Pain Class: Print Route: Oral  
 morphine CR (MS CONTIN) 15 mg CR tablet 0 Starting on: 11/1/2018 Sig: Take 1 Tab by mouth every eight (8) hours for 30 days. Max Daily Amount: 45 mg. For chronic, severe pain. Indications: Chronic Pain, NEUROPATHIC PAIN, Severe Pain Class: Print Route: Oral  
 morphine CR (MS CONTIN) 15 mg CR tablet 0 Starting on: 11/30/2018 Sig: Take 1 Tab by mouth every twelve (12) hours for 90 days. Max Daily Amount: 30 mg. For chronic, severe pain. Indications: Chronic Pain, NEUROPATHIC PAIN, Severe Pain Class: Print Route: Oral  
 oxyCODONE-acetaminophen (PERCOCET)  mg per tablet 0 Starting on: 10/27/2018 Sig: Take 1 Tab by mouth four (4) times daily as needed for Pain for up to 90 days. Max Daily Amount: 4 Tabs. Indications: Pain Class: Print Route: Oral  
 oxyCODONE-acetaminophen (PERCOCET)  mg per tablet 0 Starting on: 11/26/2018 Sig: Take 1 Tab by mouth four (4) times daily as needed for Pain for up to 30 days. Max Daily Amount: 4 Tabs. Indications: Pain Class: Print Route: Oral  
  
Follow-up Instructions Return in about 3 months (around 12/27/2018). Patient Instructions 1. Modify current plan with no evidence of addiction or diversion. Stable on current medication without adverse events.    
2. Refill morphine ER 30 mg every 12 hours x 1 month, then adjust down to 15 mg every 8 hours x 1 month, then adjust down to 15 mg every 12 hours until next visit. 3. Refill oxycodone 10/325 mg up to 3 times daily as needed times 1 month, then adjust up to 4 times daily as needed until next visit. 4. Please remember to discuss further sleep treatment with your PCP. 5. Naloxone 4 mg nasal spray for opioid induced respiratory depression emergency only. 6. Discussed risks of addiction, dependency, and opioid induced hyperalgesia. Please remember to call at least 5 business days prior to your medication refill. Return to clinic in 3 months. Please call and cancel your appointment and pain management agreement if you do decide to transfer your care. Introducing [facility]! Cherelle Reed introduces [employer] patient portal. Now you can access parts of your medical record, email your doctor's office, and request medication refills online. 1. In your internet browser, go to [email or web address] 2. Click on the First Time User? Click Here link in the Sign In box. You will see the New Member Sign Up page. 3. Enter your [employer] Access Code exactly as it appears below. You will not need to use this code after youve completed the sign-up process. If you do not sign up before the expiration date, you must request a new code. · [employer] Access Code: [email or web address] Expires: 9/27/2018 11:17 AM 
 
4. Enter the last four digits of your Social Security Number (xxxx) and Date of Birth (mm/dd/yyyy) as indicated and click Submit. You will be taken to the next sign-up page. 5. Create a Hacker[employer] ID. This will be your [employer] login ID and cannot be changed, so think of one that is secure and easy to remember. 6. Create a [employer] password. You can change your password at any time. 7. Enter your Password Reset Question and Answer. This can be used at a later time if you forget your password. 8. Enter your e-mail address. You will receive e-mail notification when new information is available in [address] 9. Click Sign Up. You can now view and download portions of your medical record. 10. Click the Download Summary menu link to download a portable copy of your medical information. If you have questions, please visit the Frequently Asked Questions section of the [employer] website. Remember, [employer] is NOT to be used for urgent needs. For medical emergencies, dial 911. Now available from your iPhone and Android! Please provide this summary of care documentation to your next provider. Your primary care clinician is listed as [address] [facility] [address] [facility]. If you have any questions after today's visit, please call [phone].

## 2018-09-27 NOTE — PATIENT INSTRUCTIONS
1. Modify current plan with no evidence of addiction or diversion. Stable on current medication without adverse events. 2. Refill morphine ER 30 mg every 12 hours x 1 month, then adjust down to 15 mg every 8 hours x 1 month, then adjust down to 15 mg every 12 hours until next visit. 3. Refill oxycodone 10/325 mg up to 3 times daily as needed times 1 month, then adjust up to 4 times daily as needed until next visit. 4. Please remember to discuss further sleep treatment with your PCP. 5. Naloxone 4 mg nasal spray for opioid induced respiratory depression emergency only. 6. Discussed risks of addiction, dependency, and opioid induced hyperalgesia. Please remember to call at least 5 business days prior to your medication refill. Return to clinic in 3 months. Please call and cancel your appointment and pain management agreement if you do decide to transfer your care.

## 2018-10-25 NOTE — TELEPHONE ENCOUNTER
Evangelina Hurley has called requesting a refill of their controlled medication, Percocet  mg tab and Morphine CR 15 mg tab, for the management of chronic back, leg and knee pain. Last office visit date: 9/27/18 with Miles Pizano PA-C. Patient has f/u appt with Jarrell Cranker on 12/10/18. Date last  was pulled and reviewed : 10/25/18. Last fill date: 9/28/18(Percocet), and 10/2/18 (Morphine). Was the patient compliant when the above report was pulled? yes    Analgesia: Patient reports 50% pain relief on current regimen. Aberrancies: None reported in the last 30 days. ADL's: Patient states they are able to perform ADL's. Adverse Reaction: Patient reports some constipation. Provider's last note and plan of care reviewed? yes  Request forwarded to provider for review. Prescriptions pre-printed by provider Christine Pizano PA-C but since he's no longer with practice, I will route to Arcenio Oates for review.     Last UDS: 6/29/18  Last OCA: 12/19/17

## 2018-10-26 DIAGNOSIS — Z79.899 HIGH RISK MEDICATION USE: Primary | ICD-10-CM

## 2018-10-26 RX ORDER — NALOXONE HYDROCHLORIDE 4 MG/.1ML
SPRAY NASAL
Qty: 1 EACH | Refills: 0 | Status: SHIPPED | OUTPATIENT
Start: 2018-10-26 | End: 2019-10-26

## 2018-10-26 NOTE — TELEPHONE ENCOUNTER
Patient identified using two patient identifiers (name and ); patient advised prescriptions are ready for pick-up. I also informed patient that Magdy Phan sent Narcan spray to their pharmacy, as well as reminding patient the r/o taking benzodiazepines and opioids concurrently (r/f respiratory depression). Patient verbalized understanding.

## 2018-10-26 NOTE — TELEPHONE ENCOUNTER
Continue opioid wean. Patient with last urine drug screen with benzodiazepine and evidence for alcohol use. Please remind patient that concurrent use of alcohol and/or benzodiazepines with opioids is a deadly combination and should be avoided. Please make patient aware that there is a prescription for Narcan rescue spray made available for him. Please repeat urine drug screen at time of prescription pickup. Reviewed. OK to distribute prescription.

## 2018-10-29 ENCOUNTER — OFFICE VISIT (OUTPATIENT)
Dept: PAIN MANAGEMENT | Age: 73
End: 2018-10-29

## 2018-10-29 DIAGNOSIS — Z79.899 ENCOUNTER FOR LONG-TERM (CURRENT) USE OF HIGH-RISK MEDICATION: Primary | ICD-10-CM

## 2018-11-26 NOTE — TELEPHONE ENCOUNTER
Kaylen Acuña has called requesting a refill of their controlled medication, Percocet 10/325 mg tab and MS Contin CR 15 mg tab, for the management of chronic,severe pain d/t generalized osteoarthritis. Last office visit date: 9/27/18 with Demarcus and has a f/u appt on 12/10/18 with Bruno. Last opioid care agreement 12/19/17  Last UDS was done 10/29/18    Date last  was pulled and reviewed : 11/26/18. Last fill date: 10/29/18 and 11/1/18. Was the patient compliant when the above report was pulled? yes    Analgesia: Patient reports 50% pain relief on current regimen. Aberrancies: None reported in the last 30 days. ADL's: Patient states they are able to perform ADL's on current regimen. Adverse Reaction: Patient reports no adverse reactions at this time. Provider's last note and plan of care reviewed? yes  Request forwarded to provider for review. Prescriptions pre-printed by SYDNIE Dale.

## 2018-12-10 ENCOUNTER — OFFICE VISIT (OUTPATIENT)
Dept: PAIN MANAGEMENT | Age: 73
End: 2018-12-10

## 2018-12-10 VITALS
HEIGHT: 73 IN | RESPIRATION RATE: 14 BRPM | TEMPERATURE: 97.1 F | OXYGEN SATURATION: 98 % | WEIGHT: 205 LBS | DIASTOLIC BLOOD PRESSURE: 72 MMHG | HEART RATE: 53 BPM | BODY MASS INDEX: 27.17 KG/M2 | SYSTOLIC BLOOD PRESSURE: 115 MMHG

## 2018-12-10 DIAGNOSIS — G89.4 CHRONIC PAIN SYNDROME: ICD-10-CM

## 2018-12-10 DIAGNOSIS — Z79.899 ENCOUNTER FOR LONG-TERM (CURRENT) USE OF HIGH-RISK MEDICATION: ICD-10-CM

## 2018-12-10 DIAGNOSIS — M48.07 NEURAL FORAMINAL STENOSIS OF LUMBOSACRAL SPINE: ICD-10-CM

## 2018-12-10 DIAGNOSIS — M54.5 CHRONIC BILATERAL LOW BACK PAIN, WITH SCIATICA PRESENCE UNSPECIFIED: Primary | ICD-10-CM

## 2018-12-10 DIAGNOSIS — G89.29 CHRONIC BILATERAL LOW BACK PAIN, WITH SCIATICA PRESENCE UNSPECIFIED: Primary | ICD-10-CM

## 2018-12-10 DIAGNOSIS — M48.061 NEURAL FORAMINAL STENOSIS OF LUMBAR SPINE: ICD-10-CM

## 2018-12-10 DIAGNOSIS — M15.9 GENERALIZED OA: ICD-10-CM

## 2018-12-10 RX ORDER — MORPHINE SULFATE 15 MG/1
15 TABLET, FILM COATED, EXTENDED RELEASE ORAL DAILY
Qty: 30 TAB | Refills: 0 | Status: SHIPPED | OUTPATIENT
Start: 2018-12-29 | End: 2019-01-28

## 2018-12-10 RX ORDER — OXYCODONE AND ACETAMINOPHEN 10; 325 MG/1; MG/1
1 TABLET ORAL
Qty: 120 TAB | Refills: 0 | Status: SHIPPED | OUTPATIENT
Start: 2018-12-27 | End: 2019-01-30 | Stop reason: SDUPTHER

## 2018-12-10 NOTE — PROGRESS NOTES
Nursing Notes    Patient presents to the office today in follow-up. Patient rates his pain at 6/10 on the numerical pain scale. Reviewed medications with counts as follows:    Rx Date filled Qty Dispensed Pill Count Last Dose Short   Oxycodone  mg 11/28/18 120 73 today no   Morphine ER 15 mg 11/30/18 60 39 today no                                Last opioid agreement 12/26/17   today  Last urine drug screen 9/11/18  PHQ over the last two weeks 12/10/2018   Little interest or pleasure in doing things Not at all   Feeling down, depressed, irritable, or hopeless Not at all   Total Score PHQ 2 0       Comments:     POC UDS was not performed in office today    Any new labs or imaging since last appointment? NO    Have you been to an emergency room (ER) or urgent care clinic since your last visit? NO            Have you been hospitalized since your last visit? NO     If yes, where, when, and reason for visit? Have you seen or consulted any other health care providers outside of the [address] [place] [address]  since your last visit? NO     If yes, where, when, and reason for visit? Mr. Jose Adamson has a reminder for a \"due or due soon\" health maintenance. I have asked that he contact his primary care provider for follow-up on this health maintenance.

## 2018-12-10 NOTE — PROGRESS NOTES
Referral date before 11, source self and for chronic low back pain, bilateral knee and foot pain. HPI:  Jazmín Mishra is a 72 y.o. male here for f/u visit for ongoing evaluation of chronic low back pain, bilateral knee and foot pain. Pt was last seen here on 18. Pt denies interval changes on the character or distribution of pain. Pain is located bilateral lower back, bilateral knees, bilateral lower extremities. The pain is described as numbness, aching to lower back, pins and needles to bilateral lower extremities, numbness to bilateral knees. Pain at its best is 4/10. Pain at its worse is 10/10. The pain is worsened by standing, walking, any activity. Symptoms are improved by lying down,weightloss helped, TENS unit (numb the area where pain was, but didn't last long). Pt has tried heat, back injections, acupuncture, chiropractic with no perceived benefit. Since last visit, pt denies changes since last visit. Pt states \"he's not going that route\" in regards to surgery. Pt has not tried compound cream, SCS trial, intrathecal pain pump, massage therapy, physical therapy (2 weeks one time).  Pt states \"\" he doesn't have the pain he did when he first came here\"\"    Social History     Socioeconomic History    Marital status:      Spouse name: Not on file    Number of children: Not on file    Years of education: Not on file    Highest education level: Not on file   Social Needs    Financial resource strain: Not on file    Food insecurity - worry: Not on file    Food insecurity - inability: Not on file    Transportation needs - medical: Not on file   [employer] needs - non-medical: Not on file   Occupational History    Not on file   Tobacco Use    Smoking status: Former Smoker     Last attempt to quit: 1992     Years since quittin.9    Smokeless tobacco: Never Used   Substance and Sexual Activity    Alcohol use: No    Drug use: No     Comment: years ago    Sexual activity: Not on file   Other Topics Concern    Not on file   Social History Narrative    Not on file     Family History   Problem Relation Age of Onset    Diabetes Other     Hypertension Other     Other Other         gout     No Known Allergies  Past Medical History:   Diagnosis Date    Atrial fibrillation ([address]     CAD (coronary artery disease)     Chronic constipation     Chronic kidney disease     Cortical age-related cataract of left eye 11/20/2016    Cortical age-related cataract of right eye 10/15/2016    Depression     Diabetes ([address]     Gout     feet and right elbow    Hypercholesterolemia     Hypertension     S/P CABG x 4     Stroke ([address]     Unstable angina ([address] 06/01/2017     Past Surgical History:   Procedure Laterality Date    CARDIAC SURG PROCEDURE UNLIST  5/20/16    quad bypass    HX CATARACT REMOVAL Right 10/17/2016    HX CORONARY ARTERY BYPASS GRAFT       4 vessel CABG    HX CORONARY ARTERY BYPASS GRAFT  05/2016    HX GASTRIC BYPASS      HX HEENT      rt eye laser surgery    HX ORTHOPAEDIC      rt knee    LEFT HEART PERCUTANEOUS  6/1/2017          Current Outpatient Medications on File Prior to Visit   Medication Sig    naloxone (NARCAN) 4 mg/actuation nasal spray Use 1 spray intranasally, then discard. Repeat with new spray every 2 min as needed for opioid overdose symptoms, alternating nostrils.  oxyCODONE-acetaminophen (PERCOCET)  mg per tablet Take 1 Tab by mouth four (4) times daily as needed for Pain for up to 30 days. Max Daily Amount: 4 Tabs. Indications: Pain    atorvastatin (LIPITOR) 40 mg tablet Take  by mouth daily.  hydrALAZINE (APRESOLINE) 25 mg tablet Take 25 mg by mouth three (3) times daily.  clopidogrel (PLAVIX) 75 mg tab Take  by mouth.  NITROGLYCERIN PO Take  by mouth.  chlorthalidone (HYGROTEN) 50 mg tablet Take 1 Tab by mouth daily.  furosemide (LASIX) 40 mg tablet Take 20 mg by mouth as needed.     aspirin (ASPIRIN) 325 mg tablet Take 81 mg by mouth daily.  allopurinol (ZYLOPRIM) 300 mg tablet Take 300 mg by mouth daily as needed.  OTHER Lift for Right Shoe to correct leg length discrepancy    cloNIDine (CATAPRES) 0.1 mg tablet Take 0.2 mg by mouth two (2) times a day.  SIMVASTATIN PO Take 20 mg by mouth nightly.  CARVEDILOL PO Take 25 mg by mouth two (2) times a day. No current facility-administered medications on file prior to visit. ROS:  Denies fever, chills, nausea, vomiting, diarrhea, constipation, abdominal pain, chest pain, shortness or breath/trouble breathing, weakness, trouble swallowing, changes in vision, changes in hearing, falls, dizziness, bladder incontinence, bowel incontinence, depression, anxiety, suicidal ideations, homicidal ideations or alcohol use. Opioid specific risk: A. fib, diabetes, depression, gastric bypass history. Opioid specific history: Concurrent opioid use since approximately before 2011, with no opioid holiday. Vitals:    12/10/18 1038   BP: 115/72   Pulse: (!) 53   Resp: 14   Temp: 97.1 °F (36.2 °C)   TempSrc: Oral   SpO2: 98%   Weight: 93 kg (205 lb)   Height: 6' 1\" (1.854 m)   PainSc:   6   PainLoc: Back        Imaging:  \"\"\" 6/3/11 MRI spine lumbar without contrast  Impression:  1. At L4-5, endplate irregularity and endplate subcortical erosions. Considering the prior disc degeneration, continued progression of the degenerative process is greatly favored over discitis or osteomyelitis. If the patient had a physical exam or history that would be suspicious for infection, then contrast enhancement would be helpful. Stenosis at detailed above, which has progressed. 2. L5-S1 disc bulge, likely unchanged compared to prior exam.  3.  L3-4 mild progression based on prior report. \"\"\"\"    Labs:   BUN/Cr: \"\"\"\" 4/13/18   22/1.3\"\"\"  AST/ALT: \"\"\" 5/23/16 23/5\"\"\"      Physical exam:  AFVS bradycarida, no acute distress, normal body habitus. A&OXs 3. Normocephalic, atraumatic. Conjugate gaze, clear sclerae. Speech is clear and appropriate. Mood is appropriate and patient is cooperative. Antalgic gait with decreased radha with assistive right-handed mono cane  Non-labored breathing. Lungs CTA B/L. No wheezing, rales or rhonchi. Heart rate irregular rhythm       LE:   Strength for right lower extremity is 5/5 for hip flexion, knee extension, dorsiflexion, extensor hallucis longus, plantar flexion. Strength for left lower extremity is 5/5 for hip flexion, knee extension, dorsiflexion, extensor hallucis longus, plantar flexion. Sensation to light touch is intact for right L2-S2. Sensation to light touch is intact for left L2-S2. Muscle Stretch reflexes for right lower extremity are absent for L4, S1.   Muscle Stretch reflexes for left lower extremity are absent for L4, S1.    Negative seated straight leg raise bilaterally. Negative supine straight leg raise bilaterally. Negative Stinchfield's on the right and a left. Negative FABERs on the right and the left. Negative FADIRS on the right and the left. Full AROM lumbar flexion decreased by 50% to endrange reproduction of primary pain. Full AROM lumbar extension did bit cause reproduction of primary pain. TTP lower left lumbar. Calculated MEQ - 90  Naloxone rescue - yes  Prophylactic bowel program - no  Date of last OCA 12/10/18  Last UDS 10/29/18, consistent POC, pending confirmatory testing   date checked today, findings consistent    Primary Care Physician  [facility]  [phone]    Today   Last Visit  Prior Visit  ORT - 0       PGIC - 5 and 3       ELIANA - 42%  54%     COMM - 1  1       PHQ -- . PHQ over the last two weeks 12/10/2018   Little interest or pleasure in doing things Not at all   Feeling down, depressed, irritable, or hopeless Not at all   Total Score PHQ 2 0       DSM V-OUD Screen -deferred    Assessment/Plan:     ICD-10-CM ICD-9-CM    1. Chronic bilateral low back pain, with sciatica presence unspecified M54.5 724.2 morphine CR (MS CONTIN) 15 mg CR tablet    G89.29 338.29 oxyCODONE-acetaminophen (PERCOCET)  mg per tablet   2. Encounter for long-term (current) use of high-risk medication Z79.899 V58.69    3. Chronic pain syndrome G89.4 338.4 morphine CR (MS CONTIN) 15 mg CR tablet      oxyCODONE-acetaminophen (PERCOCET)  mg per tablet   4. Neural foraminal stenosis of lumbosacral spine M99.83 724.03    5. Neural foraminal stenosis of lumbar spine M99.83 724.02    6. Generalized OA M15.9 715.00 morphine CR (MS CONTIN) 15 mg CR tablet      oxyCODONE-acetaminophen (PERCOCET)  mg per tablet      Pt to trial OTC tylenol 500 mg 1-2 tab up to once daily as needed for pain. Max daily dose 1000 mg. Ordered back brace from [facility] to be worn as needed for back pain    Do not recommend long term opioid therapy for this patient at this time. Pt currently taking MS Contin 15 mg 1 tablet every 12 hours for chronic pain and oxycodone/acetaminophen 10/325 mg tablet 1 tab 4 times daily as needed for pain. Their MME is 90. Today, we will continue the weaning of patients opioid medication with a goal of being opioid free, pending safety and compliance. Pt instructed to call if they experience any signs of withdrawal (diarrhea, nausea, vomiting, sweating or chills, agitation, itching). Today, pt given prescription for MS Contin 15 mg 1 tablet daily for chronic pain and oxycodone/acetaminophen 10/325 mg tablet 1 tab 4 times daily as needed for pain. Their new MME is 75. Will address short-acting opioid once extended release has been discontinued. Pt instructed to call 5-7 days before they run out of their medications for refill. At this time pt will be provided with D/C MS Contin and continue oxycodone/acetaminophen 10/325 mg tablet 1 tab 4 times daily as needed for pain. pending safety and compliance.      At following refill, pt will be provided with oxycodone/acetaminophen 10/325 mg tablet 1 tab 4 times daily as needed for pain with a total of 110 tablets to be budgeted over 30 days pending safety and compliance. At next office visit, the plan is to provide patient with oxycodone/acetaminophen 10/325 mg tablet 1 tab 4 times daily as needed for pain with a total of 100 tablets to be budgeted over 30 days. Their new MME will be 60. If patient has difficulty with the wean or difficulty with cravings we will consider referral to mental health for ongoing assessment and treatment for opioid use disorder. Patient declines compound cream and physical therapy at this time     Consider  massage therapy, physical therapy    Follow up ongoing assessment and ongoing development of integrative and comprehensive plan of care for chronic pain. Goals: To establish complementary and integrative plan of care to address chronic pain issues while minimizing pharmaceuticals to maximize patient's function improve quality of life. Education:  Patient again educated on the importance of strict compliance with the opioid care agreement while on opioid therapy. Patient also again educated that they should avoid driving while on chronic opioid therapy. Also advised to avoid alcohol and to avoid benzodiazepines while on opioid therapy. Handouts given regarding opioid safety and sleep health. Patient Homework:  Continue to independently investigate yoga for persons with chronic pain. Follow-up Disposition:  Return in about 3 months (around 3/10/2019). [address] was used for portions of this report. Unintended errors may occur.

## 2018-12-10 NOTE — PATIENT INSTRUCTIONS
Continue to independently investigate yoga for persons with chronic pain on youtube     Learning About Sleeping Well  What does sleeping well mean? Sleeping well means getting enough sleep. How much sleep is enough varies among people. The number of hours you sleep is not as important as how you feel when you wake up. If you do not feel refreshed, you probably need more sleep. Another sign of not getting enough sleep is feeling tired during the day. The average total nightly sleep time is 7½ to 8 hours. Healthy adults may need a little more or a little less than this. Why is getting enough sleep important? Getting enough quality sleep is a basic part of good health. When your sleep suffers, your mood and your thoughts can suffer too. You may find yourself feeling more grumpy or stressed. Not getting enough sleep also can lead to serious problems, including injury, accidents, anxiety, and depression. What might cause poor sleeping? Many things can cause sleep problems, including:  · Stress. Stress can be caused by fear about a single event, such as giving a speech. Or you may have ongoing stress, such as worry about work or school. · Depression, anxiety, and other mental or emotional conditions. · Changes in your sleep habits or surroundings. This includes changes that happen where you sleep, such as noise, light, or sleeping in a different bed. It also includes changes in your sleep pattern, such as having jet lag or working a late shift. · Health problems, such as pain, breathing problems, and restless legs syndrome. · Lack of regular exercise. How can you help yourself? Here are some tips that may help you sleep more soundly and wake up feeling more refreshed. Your sleeping area  · Use your bedroom only for sleeping and sex. A bit of light reading may help you fall asleep. But if it doesn't, do your reading elsewhere in the house. Don't watch TV in bed.   · Be sure your bed is big enough to stretch out comfortably, especially if you have a sleep partner. · Keep your bedroom quiet, dark, and cool. Use curtains, blinds, or a sleep mask to block out light. To block out noise, use earplugs, soothing music, or a \"white noise\" machine. Your evening and bedtime routine  · Create a relaxing bedtime routine. You might want to take a warm shower or bath, listen to soothing music, or drink a cup of noncaffeinated tea. · Go to bed at the same time every night. And get up at the same time every morning, even if you feel tired. What to avoid  · Limit caffeine (coffee, tea, caffeinated sodas) during the day, and don't have any for at least 4 to 6 hours before bedtime. · Don't drink alcohol before bedtime. Alcohol can cause you to wake up more often during the night. · Don't smoke or use tobacco, especially in the evening. Nicotine can keep you awake. · Don't take naps during the day, especially close to bedtime. · Don't lie in bed awake for too long. If you can't fall asleep, or if you wake up in the middle of the night and can't get back to sleep within 15 minutes or so, get out of bed and go to another room until you feel sleepy. · Don't take medicine right before bed that may keep you awake or make you feel hyper or energized. Your doctor can tell you if your medicine may do this and if you can take it earlier in the day. If you can't sleep  · Imagine yourself in a peaceful, pleasant scene. Focus on the details and feelings of being in a place that is relaxing. · Get up and do a quiet or boring activity until you feel sleepy. · Don't drink any liquids after 6 p.m. if you wake up often because you have to go to the bathroom. Where can you learn more? Go to http://selina-trinity.info/. Enter S930 in the search box to learn more about \"Learning About Sleeping Well. \"  Current as of: December 7, 2017  Content Version: 11.8  © 4529-4000 Healthwise, Incorporated.  Care instructions adapted under license by [address] (which disclaims liability or warranty for this information). If you have questions about a medical condition or this instruction, always ask your healthcare professional. [address] any warranty or liability for your use of this information. Safe Use of Opioid Pain Medicine: Care Instructions  Your Care Instructions  Pain is your body's way of warning you that something is wrong. Pain feels different for everybody. Only you can describe your pain. A doctor can suggest or prescribe many types of medicines for pain. These range from over-the-counter medicines like acetaminophen (Tylenol) to powerful medicines called opioids. Examples of opioids are fentanyl, hydrocodone, morphine, and oxycodone. Heroin is an illegal opioid  Opioids are strong medicines. They can help you manage pain when you use them the right way. But if you misuse them, they can cause serious harm and even death. For these reasons, doctors are very careful about how they prescribe opioids. If you decide to take opioids, here are some things to remember. · Keep your doctor informed. You can get addicted to opioids. The risk is higher if you have a history of substance use. Your doctor will monitor you closely for signs of misuse and addiction and to figure out when you no longer need to take opioids. · Make a treatment plan. The goal of your plan is to be able to function and do the things you need to do, even if you still have some pain. You might be able to manage your pain with other non-opioid options like physical therapy, relaxation, or over-the-counter pain medicines. · Be aware of the side effects. Opioids can cause serious side effects, such as constipation, dry mouth, and nausea. And over time, you may need a higher dose to get pain relief. This is called tolerance. Your body also gets used to opioids. This is called physical dependence.  If you suddenly stop taking them, you may have withdrawal symptoms. The doctor carefully considered what pain medicine is right for you. You may not have received opioids if your doctor was concerned about drug interactions or your safety, or if he or she had other concerns. It is best to have one doctor or clinic treat your pain. This way you will get the pain medicine that will help you the most. And a doctor will be able to watch for any problems that the medicine might cause. The doctor has checked you carefully, but problems can develop later. If you notice any problems or new symptoms,  get medical treatment right away. Follow-up care is a key part of your treatment and safety. Be sure to make and go to all appointments, and call your doctor if you are having problems. It's also a good idea to know your test results and keep a list of the medicines you take. How can you care for yourself at home? · If you need to take opioids to manage your pain, remember these safety tips. ? Follow directions carefully. It's easy to misuse opioids if you take a dose other than what's prescribed by your doctor. This can lead to overdose and even death. Even sharing them with someone they weren't meant for is misuse. ? Be cautious. Opioids may affect your judgment and decision making. Do not drive or operate machinery until you can think clearly. Talk with your doctor about when it is safe to drive. ? Reduce the risk of drug interactions. Opioids can be dangerous if you take them with alcohol or with certain drugs like sleeping pills and muscle relaxers. Make sure your doctor knows about all the other medicines you take, including over-the-counter medicines. Don't start any new medicines before you talk to your doctor or pharmacist.  ? Keep others safe. Store opioids in a safe and secure place. Make sure that pets, children, friends, and family can't get to them. When you're done using opioids, make sure to properly dispose of them.  You can either use a community drug take-back program or your drugstore's mail-back program. If one of these programs isn't available, you can flush opioid skin patches and unused opioid pills down the toilet. ? Reduce the risk of overdose. Misuse of opioids can be very dangerous. Protect yourself by asking your doctor about a naloxone rescue kit. It can help you--and even save your life--if you take too much of an opioid. · Try other ways to reduce pain. ? Relax, and reduce stress. Relaxation techniques such as deep breathing or meditation can help. ? Keep moving. Gentle, daily exercise can help reduce pain over the long run. Try low- or no-impact exercises such as walking, swimming, and stationary biking. Do stretches to stay flexible. ? Try heat, cold packs, and massage. ? Get enough sleep. Pain can make you tired and drain your energy. Talk with your doctor if you have trouble sleeping because of pain. ? Think positive. Your thoughts can affect your pain level. Do things that you enjoy to distract yourself when you have pain instead of focusing on the pain. See a movie, read a book, listen to music, or spend time with a friend. · If you are not taking a prescription pain medicine, ask your doctor if you can take an over-the-counter medicine. When should you call for help? Call your doctor now or seek immediate medical care if:    · You have a new kind of pain.     · You have new symptoms, such as a fever or rash, along with the pain.    Watch closely for changes in your health, and be sure to contact your doctor if:    · You think you might be using too much pain medicine, and you need help to use less or stop.     · Your pain gets worse.     · You would like a referral to a doctor or clinic that specializes in pain management. Where can you learn more? Go to http://selina-trinity.info/. Enter R108 in the search box to learn more about \"Safe Use of Opioid Pain Medicine: Care Instructions. \"  Current as of: September 10, 2017  Content Version: 11.8  © 0609-8655 Healthwise, Incorporated. Care instructions adapted under license by [employer] (which disclaims liability or warranty for this information). If you have questions about a medical condition or this instruction, always ask your healthcare professional. [address] any warranty or liability for your use of this information.

## 2019-01-28 ENCOUNTER — TELEPHONE (OUTPATIENT)
Dept: PAIN MANAGEMENT | Age: 74
End: 2019-01-28

## 2019-01-30 DIAGNOSIS — M54.5 CHRONIC BILATERAL LOW BACK PAIN, WITH SCIATICA PRESENCE UNSPECIFIED: ICD-10-CM

## 2019-01-30 DIAGNOSIS — G89.4 CHRONIC PAIN SYNDROME: ICD-10-CM

## 2019-01-30 DIAGNOSIS — G89.29 CHRONIC BILATERAL LOW BACK PAIN, WITH SCIATICA PRESENCE UNSPECIFIED: ICD-10-CM

## 2019-01-30 DIAGNOSIS — M15.9 GENERALIZED OA: ICD-10-CM

## 2019-01-30 RX ORDER — OXYCODONE AND ACETAMINOPHEN 10; 325 MG/1; MG/1
1 TABLET ORAL
Qty: 120 TAB | Refills: 0 | Status: SHIPPED | OUTPATIENT
Start: 2019-01-30 | End: 2019-03-12

## 2019-01-30 NOTE — TELEPHONE ENCOUNTER
Per LUZ Villegas, they attempted to contact patient on 1/28/19 at [phone], but was told no one by the patient's name was there at that number.

## 2019-01-30 NOTE — TELEPHONE ENCOUNTER
[facility] has called requesting a refill of their controlled medication, Percocet 10/325 mg tab, for the management of chronic back pain. Last office visit date: 12/10/18 with Bruno and has a f/u appt on 3/11/19 with them. Last opioid care agreement 11/28/18  Last UDS was done 10/29/18    Date last  was pulled and reviewed : 1/30/19  Last fill date for medication was 12/27/18    Was the patient compliant when the above report was pulled? yes    Analgesia: Patient reports 50% pain relief on current regimen. Aberrancies: No aberrancies noted in the last 30 days. ADL's: Patient states they are able to perform ADL's on current regimen. Adverse Reaction: Patient reports no adverse reactions at this time. Provider's last note and plan of care reviewed? yes  Request forwarded to provider for review.

## 2019-02-22 DIAGNOSIS — G89.4 CHRONIC PAIN SYNDROME: Primary | ICD-10-CM

## 2019-02-22 NOTE — TELEPHONE ENCOUNTER
Patient called the office to concerning his medication refill I completed the 4 A's    Oxycodone     50% pain relief     No - side affects    Yes-- able to perform daily  task    Please give patient a call when meds are ready.

## 2019-02-25 ENCOUNTER — HOSPITAL ENCOUNTER (OUTPATIENT)
Dept: LAB | Age: 74
Discharge: HOME | End: 2019-02-25
Payer: MEDICARE

## 2019-02-25 DIAGNOSIS — R63.4 ABNORMAL WEIGHT LOSS: ICD-10-CM

## 2019-02-25 DIAGNOSIS — R68.81 EARLY SATIETY: ICD-10-CM

## 2019-02-25 DIAGNOSIS — R03.0 ELEVATED BLOOD PRESSURE READING WITHOUT DIAGNOSIS OF HYPERTENSION: ICD-10-CM

## 2019-02-25 LAB
ALBUMIN SERPL-MCNC: 1.7 G/DL (ref 3.4–5)
ALBUMIN/GLOB SERPL: 0.4 {RATIO} (ref 0.8–1.7)
ALP SERPL-CCNC: 164 U/L (ref 45–117)
ALT SERPL-CCNC: 11 U/L (ref 16–61)
ANION GAP SERPL CALC-SCNC: 6 MMOL/L (ref 3–18)
AST SERPL-CCNC: 17 U/L (ref 15–37)
BASOPHILS # BLD: 0 K/UL (ref 0–0.1)
BASOPHILS NFR BLD: 0 % (ref 0–2)
BILIRUB SERPL-MCNC: 0.6 MG/DL (ref 0.2–1)
BUN SERPL-MCNC: 23 MG/DL (ref 7–18)
BUN/CREAT SERPL: 15 (ref 12–20)
CALCIUM SERPL-MCNC: 7.6 MG/DL (ref 8.5–10.1)
CHLORIDE SERPL-SCNC: 105 MMOL/L (ref 100–108)
CO2 SERPL-SCNC: 29 MMOL/L (ref 21–32)
CREAT SERPL-MCNC: 1.58 MG/DL (ref 0.6–1.3)
CRP SERPL-MCNC: 1.9 MG/DL (ref 0–0.3)
DIFFERENTIAL METHOD BLD: ABNORMAL
EOSINOPHIL # BLD: 0.1 K/UL (ref 0–0.4)
EOSINOPHIL NFR BLD: 1 % (ref 0–5)
ERYTHROCYTE [DISTWIDTH] IN BLOOD BY AUTOMATED COUNT: 15.9 % (ref 11.6–14.5)
FERRITIN SERPL-MCNC: 340 NG/ML (ref 8–388)
GLOBULIN SER CALC-MCNC: 4.8 G/DL (ref 2–4)
GLUCOSE SERPL-MCNC: 79 MG/DL (ref 74–99)
HCT VFR BLD AUTO: 30.6 % (ref 36–48)
HGB BLD-MCNC: 10.2 G/DL (ref 13–16)
IRON SATN MFR SERPL: 55 %
IRON SERPL-MCNC: 39 UG/DL (ref 50–175)
LYMPHOCYTES # BLD: 1.9 K/UL (ref 0.9–3.6)
LYMPHOCYTES NFR BLD: 35 % (ref 21–52)
MCH RBC QN AUTO: 29.9 PG (ref 24–34)
MCHC RBC AUTO-ENTMCNC: 33.3 G/DL (ref 31–37)
MCV RBC AUTO: 89.7 FL (ref 74–97)
MONOCYTES # BLD: 0.4 K/UL (ref 0.05–1.2)
MONOCYTES NFR BLD: 7 % (ref 3–10)
NEUTS SEG # BLD: 3.1 K/UL (ref 1.8–8)
NEUTS SEG NFR BLD: 57 % (ref 40–73)
PLATELET # BLD AUTO: 270 K/UL (ref 135–420)
PMV BLD AUTO: 9.3 FL (ref 9.2–11.8)
POTASSIUM SERPL-SCNC: 4.7 MMOL/L (ref 3.5–5.5)
PROT SERPL-MCNC: 6.5 G/DL (ref 6.4–8.2)
RBC # BLD AUTO: 3.41 M/UL (ref 4.7–5.5)
SODIUM SERPL-SCNC: 140 MMOL/L (ref 136–145)
T4 FREE SERPL-MCNC: 0.9 NG/DL (ref 0.7–1.5)
TIBC SERPL-MCNC: 71 UG/DL (ref 250–450)
TSH SERPL DL<=0.05 MIU/L-ACNC: 0.93 UIU/ML (ref 0.36–3.74)
WBC # BLD AUTO: 5.4 K/UL (ref 4.6–13.2)

## 2019-02-25 PROCEDURE — 86140 C-REACTIVE PROTEIN: CPT

## 2019-02-25 PROCEDURE — 36415 COLL VENOUS BLD VENIPUNCTURE: CPT

## 2019-02-25 PROCEDURE — 84443 ASSAY THYROID STIM HORMONE: CPT

## 2019-02-25 PROCEDURE — 85025 COMPLETE CBC W/AUTO DIFF WBC: CPT

## 2019-02-25 PROCEDURE — 80053 COMPREHEN METABOLIC PANEL: CPT

## 2019-02-25 PROCEDURE — 84439 ASSAY OF FREE THYROXINE: CPT

## 2019-02-25 PROCEDURE — 83516 IMMUNOASSAY NONANTIBODY: CPT

## 2019-02-25 PROCEDURE — 83540 ASSAY OF IRON: CPT

## 2019-02-25 PROCEDURE — 82728 ASSAY OF FERRITIN: CPT

## 2019-02-26 RX ORDER — OXYCODONE AND ACETAMINOPHEN 10; 325 MG/1; MG/1
1 TABLET ORAL
Qty: 110 TAB | Refills: 0 | Status: SHIPPED | OUTPATIENT
Start: 2019-03-02 | End: 2019-03-21 | Stop reason: SDUPTHER

## 2019-02-26 NOTE — TELEPHONE ENCOUNTER
[facility]rita Nunez has called requesting a refill of their controlled medication, oxycodone, for the management of back pain. Last office visit date: 12/10/18 with Segun Hu, next 3/11/19 with Segun Hu  Last opioid care agreement 11/29/18  Last UDS was done 11/3/18    Date last  was pulled and reviewed : 2/16/19  Last fill date for medication was 1/31/19    Was the patient compliant when the above report was pulled? yes    Analgesia: 50%    Aberrancies: none    ADL's: yes    Adverse Reaction: no    Provider's last note and plan of care reviewed? yes  Request forwarded to provider for review.

## 2019-02-27 LAB
ENDOMYSIUM IGA SER QL: NEGATIVE
GLIADIN PEPTIDE IGA SER-ACNC: 13 UNITS (ref 0–19)
GLIADIN PEPTIDE IGG SER-ACNC: 7 UNITS (ref 0–19)
IGA SERPL-MCNC: 909 MG/DL (ref 61–437)
TTG IGA SER-ACNC: <2 U/ML (ref 0–3)
TTG IGG SER-ACNC: 4 U/ML (ref 0–5)

## 2019-03-12 RX ORDER — RANITIDINE 150 MG/1
150 TABLET, FILM COATED ORAL DAILY
Status: ON HOLD | COMMUNITY
End: 2019-10-26

## 2019-03-14 ENCOUNTER — ANESTHESIA EVENT (OUTPATIENT)
Dept: ENDOSCOPY | Age: 74
End: 2019-03-14
Payer: MEDICARE

## 2019-03-15 ENCOUNTER — ANESTHESIA (OUTPATIENT)
Dept: ENDOSCOPY | Age: 74
End: 2019-03-15
Payer: MEDICARE

## 2019-03-15 ENCOUNTER — HOSPITAL ENCOUNTER (OUTPATIENT)
Age: 74
Setting detail: OUTPATIENT SURGERY
Discharge: HOME | End: 2019-03-15
Attending: INTERNAL MEDICINE | Admitting: INTERNAL MEDICINE
Payer: MEDICARE

## 2019-03-15 VITALS
BODY MASS INDEX: 23.49 KG/M2 | RESPIRATION RATE: 20 BRPM | HEIGHT: 74 IN | WEIGHT: 183 LBS | DIASTOLIC BLOOD PRESSURE: 72 MMHG | SYSTOLIC BLOOD PRESSURE: 148 MMHG | HEART RATE: 72 BPM | OXYGEN SATURATION: 99 % | TEMPERATURE: 97.4 F

## 2019-03-15 LAB
GLUCOSE BLD STRIP.AUTO-MCNC: 92 MG/DL (ref 70–110)
GLUCOSE BLD STRIP.AUTO-MCNC: 98 MG/DL (ref 70–110)

## 2019-03-15 PROCEDURE — 76060000032 HC ANESTHESIA 0.5 TO 1 HR: Performed by: INTERNAL MEDICINE

## 2019-03-15 PROCEDURE — 88305 TISSUE EXAM BY PATHOLOGIST: CPT

## 2019-03-15 PROCEDURE — 77030019988 HC FCPS ENDOSC DISP BSC -B: Performed by: INTERNAL MEDICINE

## 2019-03-15 PROCEDURE — 77030018846 HC SOL IRR STRL H20 ICUM -A: Performed by: INTERNAL MEDICINE

## 2019-03-15 PROCEDURE — 74011250636 HC RX REV CODE- 250/636: Performed by: NURSE ANESTHETIST, CERTIFIED REGISTERED

## 2019-03-15 PROCEDURE — 77030008565 HC TBNG SUC IRR ERBE -B: Performed by: INTERNAL MEDICINE

## 2019-03-15 PROCEDURE — 82962 GLUCOSE BLOOD TEST: CPT

## 2019-03-15 PROCEDURE — 74011000250 HC RX REV CODE- 250: Performed by: NURSE ANESTHETIST, CERTIFIED REGISTERED

## 2019-03-15 PROCEDURE — 74011250636 HC RX REV CODE- 250/636

## 2019-03-15 PROCEDURE — C1726 CATH, BAL DIL, NON-VASCULAR: HCPCS | Performed by: INTERNAL MEDICINE

## 2019-03-15 PROCEDURE — 76040000007: Performed by: INTERNAL MEDICINE

## 2019-03-15 RX ORDER — MAGNESIUM SULFATE 100 %
4 CRYSTALS MISCELLANEOUS AS NEEDED
Status: DISCONTINUED | OUTPATIENT
Start: 2019-03-15 | End: 2019-03-15 | Stop reason: HOSPADM

## 2019-03-15 RX ORDER — SODIUM CHLORIDE 0.9 % (FLUSH) 0.9 %
5-40 SYRINGE (ML) INJECTION AS NEEDED
Status: DISCONTINUED | OUTPATIENT
Start: 2019-03-15 | End: 2019-03-15 | Stop reason: HOSPADM

## 2019-03-15 RX ORDER — DEXTROSE 50 % IN WATER (D50W) INTRAVENOUS SYRINGE
25-50 AS NEEDED
Status: DISCONTINUED | OUTPATIENT
Start: 2019-03-15 | End: 2019-03-15 | Stop reason: HOSPADM

## 2019-03-15 RX ORDER — SODIUM CHLORIDE 0.9 % (FLUSH) 0.9 %
5-40 SYRINGE (ML) INJECTION EVERY 8 HOURS
Status: DISCONTINUED | OUTPATIENT
Start: 2019-03-15 | End: 2019-03-15 | Stop reason: HOSPADM

## 2019-03-15 RX ORDER — LIDOCAINE HYDROCHLORIDE 20 MG/ML
INJECTION, SOLUTION EPIDURAL; INFILTRATION; INTRACAUDAL; PERINEURAL AS NEEDED
Status: DISCONTINUED | OUTPATIENT
Start: 2019-03-15 | End: 2019-03-15 | Stop reason: HOSPADM

## 2019-03-15 RX ORDER — INSULIN LISPRO 100 [IU]/ML
INJECTION, SOLUTION INTRAVENOUS; SUBCUTANEOUS ONCE
Status: DISCONTINUED | OUTPATIENT
Start: 2019-03-15 | End: 2019-03-15 | Stop reason: HOSPADM

## 2019-03-15 RX ORDER — PROPOFOL 10 MG/ML
INJECTION, EMULSION INTRAVENOUS AS NEEDED
Status: DISCONTINUED | OUTPATIENT
Start: 2019-03-15 | End: 2019-03-15 | Stop reason: HOSPADM

## 2019-03-15 RX ORDER — SODIUM CHLORIDE, SODIUM LACTATE, POTASSIUM CHLORIDE, CALCIUM CHLORIDE 600; 310; 30; 20 MG/100ML; MG/100ML; MG/100ML; MG/100ML
75 INJECTION, SOLUTION INTRAVENOUS CONTINUOUS
Status: DISCONTINUED | OUTPATIENT
Start: 2019-03-15 | End: 2019-03-15 | Stop reason: HOSPADM

## 2019-03-15 RX ORDER — SODIUM CHLORIDE, SODIUM LACTATE, POTASSIUM CHLORIDE, CALCIUM CHLORIDE 600; 310; 30; 20 MG/100ML; MG/100ML; MG/100ML; MG/100ML
INJECTION, SOLUTION INTRAVENOUS
Status: DISCONTINUED | OUTPATIENT
Start: 2019-03-15 | End: 2019-03-15 | Stop reason: HOSPADM

## 2019-03-15 RX ADMIN — PROPOFOL 50 MG: 10 INJECTION, EMULSION INTRAVENOUS at 07:52

## 2019-03-15 RX ADMIN — PROPOFOL 50 MG: 10 INJECTION, EMULSION INTRAVENOUS at 07:44

## 2019-03-15 RX ADMIN — SODIUM CHLORIDE, SODIUM LACTATE, POTASSIUM CHLORIDE, AND CALCIUM CHLORIDE 75 ML/HR: 600; 310; 30; 20 INJECTION, SOLUTION INTRAVENOUS at 07:00

## 2019-03-15 RX ADMIN — PROPOFOL 50 MG: 10 INJECTION, EMULSION INTRAVENOUS at 07:55

## 2019-03-15 RX ADMIN — SODIUM CHLORIDE, SODIUM LACTATE, POTASSIUM CHLORIDE, CALCIUM CHLORIDE: 600; 310; 30; 20 INJECTION, SOLUTION INTRAVENOUS at 07:33

## 2019-03-15 RX ADMIN — FAMOTIDINE 20 MG: 10 INJECTION INTRAVENOUS at 07:08

## 2019-03-15 RX ADMIN — PROPOFOL 50 MG: 10 INJECTION, EMULSION INTRAVENOUS at 07:49

## 2019-03-15 RX ADMIN — LIDOCAINE HYDROCHLORIDE 40 MG: 20 INJECTION, SOLUTION EPIDURAL; INFILTRATION; INTRACAUDAL; PERINEURAL at 07:44

## 2019-03-15 NOTE — ANESTHESIA POSTPROCEDURE EVALUATION
Procedure(s):  ENDOSCOPY with Bx's, with Dilatation.     Anesthesia Post Evaluation      Multimodal analgesia: multimodal analgesia used between 6 hours prior to anesthesia start to PACU discharge  Patient location during evaluation: bedside  Patient participation: complete - patient participated  Level of consciousness: awake  Pain management: adequate  Airway patency: patent  Anesthetic complications: no  Cardiovascular status: acceptable  Respiratory status: acceptable  Hydration status: acceptable  Post anesthesia nausea and vomiting:  none      Visit Vitals  /72 (BP 1 Location: Left arm, BP Patient Position: At rest)   Pulse 72   Temp 36.3 °C (97.4 °F)   Resp 20   Ht 6' 1.5\" (1.867 m)   Wt 83 kg (183 lb)   SpO2 99%   BMI 23.82 kg/m²

## 2019-03-15 NOTE — ANESTHESIA PREPROCEDURE EVALUATION
Anesthetic History   No history of anesthetic complications            Review of Systems / Medical History  Patient summary reviewed and pertinent labs reviewed    Pulmonary        Sleep apnea           Neuro/Psych       CVA  TIA and psychiatric history     Cardiovascular    Hypertension: well controlled        Dysrhythmias   CAD, cardiac stents and CABG    Exercise tolerance: <4 METS  Comments: Hx CABG and stent   GI/Hepatic/Renal                Endo/Other    Diabetes: type 2    Arthritis     Other Findings            Physical Exam    Airway  Mallampati: I  TM Distance: 4 - 6 cm  Neck ROM: normal range of motion   Mouth opening: Normal     Cardiovascular    Rhythm: regular  Rate: normal         Dental    Dentition: Poor dentition     Pulmonary  Breath sounds clear to auscultation               Abdominal  GI exam deferred       Other Findings            Anesthetic Plan    ASA: 3  Anesthesia type: MAC            Anesthetic plan and risks discussed with: Patient

## 2019-03-15 NOTE — DISCHARGE INSTRUCTIONS
Learning About Clear Liquid Diets  Introduction    Sometimes you have to stop eating solid food for a while. This may be because you're preparing for a medical procedure or recovering from one. Or solid food might irritate your digestive tract because of illness. But even when you can't eat solid food, your body still needs liquids and energy. A clear liquid diet gives your body basic nutrition until you can go back to eating solid food. Your body digests these foods easily, and these foods don't leave behind any solids as they pass through your system. The clear liquid diet helps reduce the nausea and diarrhea you may have while you're ill. A clear liquid diet isn't very satisfying. And it doesn't supply all the nutrients you need over the long term. But you will be on this diet for just a day or two. After that, you will probably start eating bland foods. Soon you'll be able to return to your regular diet. Examples  A clear liquid diet may include:  · Clear, fat-free broth and bouillon. · Water, ice chips, and flavored ice pops. · Strained fruit juices and flavored fruit drinks. · Fruit-flavored gelatin, like Jell-O.  · Soft drinks like lemon-lime soda and ginger ale. · Sweetened coffee or tea without cream.  Where can you learn more? Go to http://selina-trinity.info/. Enter H465 in the search box to learn more about \"Learning About Clear Liquid Diets. \"  Current as of: March 28, 2018  Content Version: 11.9  © 3567-5904 [employer]. Care instructions adapted under license by [employer] (which disclaims liability or warranty for this information). If you have questions about a medical condition or this instruction, always ask your healthcare professional. [place] [address] any warranty or liability for your use of this information.   Peptic Ulcer Disease: Care Instructions  Your Care Instructions    Peptic ulcers are sores on the inside of the stomach or the small intestine (such as a duodenal ulcer). They are most often caused by an infection with bacteria or from use of nonsteroidal anti-inflammatory drugs (NSAIDs). NSAIDs include aspirin, ibuprofen (Advil), and naproxen (Aleve). Your doctor may have prescribed medicine to reduce stomach acid. You also may need to take antibiotics if your peptic ulcers are caused by an infection. You can help yourself heal and keep ulcers from coming back by making some changes in your lifestyle. Quit smoking. Limit alcohol. Follow-up care is a key part of your treatment and safety. Be sure to make and go to all appointments, and call your doctor if you are having problems. It's also a good idea to know your test results and keep a list of the medicines you take. How can you care for yourself at home? · Be safe with medicines. Take your medicines exactly as prescribed. Call your doctor if you think you are having a problem with your medicine. · Do not take aspirin or other NSAIDs such as ibuprofen (Advil or Motrin) or naproxen (Aleve). Ask your doctor what you can take for pain. · Do not smoke. Smoking can make ulcers worse. If you need help quitting, talk to your doctor about stop-smoking programs and medicines. These can increase your chances of quitting for good. · Drink in moderation or avoid drinking alcohol. · Eat a balanced diet of small, frequent meals. See a dietitian if you need help planning your meals. When should you call for help? [id number] anytime you think you may need emergency care.  For example, call if:    · You vomit blood or what looks like coffee grounds.     · You pass maroon or very bloody stools.    Call your doctor now or seek immediate medical care if:    · You have new or worse belly pain.     · Your stools are black and look like tar, or they have streaks of blood.     · You vomit.    Watch closely for changes in your health, and be sure to contact your doctor if:    · You do not get better as expected. Where can you learn more? Go to http://selina-trinity.info/. Enter I719 in the search box to learn more about \"Peptic Ulcer Disease: Care Instructions. \"  Current as of: March 27, 2018  Content Version: 11.9  © 7192-8988 [employer]. Care instructions adapted under license by [employer] (which disclaims liability or warranty for this information). If you have questions about a medical condition or this instruction, always ask your healthcare professional. [place] [address] any warranty or liability for your use of this information. Esophageal Dilation: What to Expect at [address] [place] [address]  After you have esophageal dilation, you will stay at the hospital or surgery center for 1 to 2 hours. This will allow the medicine to wear off. You will be able to go home after your doctor or nurse checks to make sure you are not having any problems. This care sheet gives you a general idea about how long it will take for you to recover. But each person recovers at a different pace. Follow the steps below to get better as quickly as possible. How can you care for yourself at home? Activity    · Rest as much as you need to after you go home.     · You should be able to go back to your usual activities the day after the procedure. Diet    · Follow your doctor's directions for eating after the procedure.     · Drink plenty of fluids (unless your doctor has told you not to). Medicines    · Your doctor will tell you if and when you can restart your medicines. He or she will also give you instructions about taking any new medicines.     · If you take blood thinners, such as warfarin (Coumadin), clopidogrel (Plavix), or aspirin, be sure to talk to your doctor. He or she will tell you if and when to start taking those medicines again.  Make sure that you understand exactly what your doctor wants you to do.     · If you have a sore throat the day after the procedure, use an over-the-counter spray to numb your throat. Sucking on throat lozenges and gargling with warm salt water may also help relieve your symptoms. Follow-up care is a key part of your treatment and safety. Be sure to make and go to all appointments, and call your doctor if you are having problems. It's also a good idea to know your test results and keep a list of the medicines you take. When should you call for help? Call 911 anytime you think you may need emergency care. For example, call if:    · You passed out (lost consciousness).     · You have trouble breathing.     · Your stools are maroon or very bloody    Call your doctor now or seek immediate medical care if:    · You have new or worse belly pain.     · You have a fever.     · You have new or more blood in your stools.     · You are sick to your stomach and cannot drink fluids.     · You cannot pass stools or gas.     · You have pain that does not get better after you take pain medicine.    Watch closely for changes in your health, and be sure to contact your doctor if:    · Your throat still hurts after a day or two.     · You do not get better as expected. Where can you learn more? Go to http://selina-trinity.info/. Enter W351 in the search box to learn more about \"Esophageal Dilation: What to Expect at Home. \"  Current as of: March 27, 2018  Content Version: 11.9  © 1735-6206 [employer]. Care instructions adapted under license by [employer] (which disclaims liability or warranty for this information). If you have questions about a medical condition or this instruction, always ask your healthcare professional. [place] [address] any warranty or liability for your use of this information.   DISCHARGE SUMMARY from Nurse    PATIENT INSTRUCTIONS:    After general anesthesia or intravenous sedation, for 24 hours or while taking prescription Narcotics:  · Limit your activities  · Do not drive and operate hazardous machinery  · Do not make important personal or business decisions  · Do  not drink alcoholic beverages  · If you have not urinated within 8 hours after discharge, please contact your surgeon on call. Report the following to your surgeon:  · Excessive pain, swelling, redness or odor of or around the surgical area  · Temperature over 100.5  · Nausea and vomiting lasting longer than 4 hours or if unable to take medications  · Any signs of decreased circulation or nerve impairment to extremity: change in color, persistent  numbness, tingling, coldness or increase pain  · Any questions      *  Please give a list of your current medications to your Primary Care Provider. *  Please update this list whenever your medications are discontinued, doses are      changed, or new medications (including over-the-counter products) are added. *  Please carry medication information at all times in case of emergency situations. These are general instructions for a healthy lifestyle:    No smoking/ No tobacco products/ Avoid exposure to second hand smoke  Surgeon General's Warning:  Quitting smoking now greatly reduces serious risk to your health. Obesity, smoking, and sedentary lifestyle greatly increases your risk for illness    A healthy diet, regular physical exercise & weight monitoring are important for maintaining a healthy lifestyle    You may be retaining fluid if you have a history of heart failure or if you experience any of the following symptoms:  Weight gain of 3 pounds or more overnight or 5 pounds in a week, increased swelling in our hands or feet or shortness of breath while lying flat in bed. Please call your doctor as soon as you notice any of these symptoms; do not wait until your next office visit.     Recognize signs and symptoms of STROKE:    F-face looks uneven    A-arms unable to move or move unevenly    S-speech slurred or non-existent    T-time-call 911 as soon as signs and symptoms begin-DO NOT go       Back to bed or wait to see if you get better-TIME IS BRAIN. Warning Signs of HEART ATTACK     Call 911 if you have these symptoms:   Chest discomfort. Most heart attacks involve discomfort in the center of the chest that lasts more than a few minutes, or that goes away and comes back. It can feel like uncomfortable pressure, squeezing, fullness, or pain.  Discomfort in other areas of the upper body. Symptoms can include pain or discomfort in one or both arms, the back, neck, jaw, or stomach.  Shortness of breath with or without chest discomfort.  Other signs may include breaking out in a cold sweat, nausea, or lightheadedness. Don't wait more than five minutes to call 911 - MINUTES MATTER! Fast action can save your life. Calling 911 is almost always the fastest way to get lifesaving treatment. Emergency Medical Services staff can begin treatment when they arrive -- up to an hour sooner than if someone gets to the hospital by car. The discharge information has been reviewed with the patient and spouse. The patient and spouse verbalized understanding. Discharge medications reviewed with the patient and spouse and appropriate educational materials and side effects teaching were provided.   ___________________________________________________________________________________________________________________________________

## 2019-03-15 NOTE — PERIOP NOTES
Late entry     Patient confirmed by two identifiers with discharge instructions prior too being provided to patient and spouse. Patient armband removed and shredded.

## 2019-03-15 NOTE — H&P
History and Physical reviewed; I have examined the patient and there are no pertinent changes. Matheus Nicole MD, MD   7:29 AM 3/15/2019  [facility], [address] [place] [address]  [email or web address]

## 2019-03-15 NOTE — PROCEDURES
[place]  [facility], [address]      Brief Procedure Note    Leon Fraga  1945  [id number]    Date of Procedure: 3/15/2019    Preoperative diagnosis: ELEVATED BLOOD PRESSURE, VOMITING, EARLY SATIETY, WEIGHT LOSS, EPIGASTRIC PAIN, LONG TERM(CURRENT)USE OF ANTICOAGULANTS    Postoperative diagnosis:  Anastomotic ulcer with Bx's, with CRE Ballom Dilatation, 2 24fr, 8 ml    Type of Anesthesia: MAC (monitered anesthesia care)    Description of Findings: same as post op dx    Procedure: Procedure(s):  ENDOSCOPY with Bx's, with Dilatation    :  Dr. Silvio Cox MD    Assistant(s): [unfilled]    Type of Anesthesia:MAC     EBL:None    Specimens:   ID Type Source Tests Collected by Time Destination   1 : Anastomotic Ulcer Bx's Preservative Gastric  Ward Juan MD 3/15/2019 0750 Pathology       Findings: See printed and scanned procedure note    Complications: None    Dr. Silvio Cox MD  3/15/2019  8:15 AM

## 2019-03-21 ENCOUNTER — OFFICE VISIT (OUTPATIENT)
Dept: PAIN MANAGEMENT | Age: 74
End: 2019-03-21

## 2019-03-21 VITALS
DIASTOLIC BLOOD PRESSURE: 69 MMHG | WEIGHT: 183 LBS | BODY MASS INDEX: 23.49 KG/M2 | SYSTOLIC BLOOD PRESSURE: 117 MMHG | RESPIRATION RATE: 16 BRPM | HEIGHT: 74 IN

## 2019-03-21 DIAGNOSIS — M48.061 NEURAL FORAMINAL STENOSIS OF LUMBAR SPINE: ICD-10-CM

## 2019-03-21 DIAGNOSIS — M15.9 GENERALIZED OA: ICD-10-CM

## 2019-03-21 DIAGNOSIS — M48.07 NEURAL FORAMINAL STENOSIS OF LUMBOSACRAL SPINE: ICD-10-CM

## 2019-03-21 DIAGNOSIS — Z79.899 ENCOUNTER FOR LONG-TERM (CURRENT) USE OF HIGH-RISK MEDICATION: ICD-10-CM

## 2019-03-21 DIAGNOSIS — M54.5 CHRONIC BILATERAL LOW BACK PAIN, WITH SCIATICA PRESENCE UNSPECIFIED: Primary | ICD-10-CM

## 2019-03-21 DIAGNOSIS — G89.4 CHRONIC PAIN SYNDROME: ICD-10-CM

## 2019-03-21 DIAGNOSIS — G89.29 CHRONIC BILATERAL LOW BACK PAIN, WITH SCIATICA PRESENCE UNSPECIFIED: Primary | ICD-10-CM

## 2019-03-21 RX ORDER — OMEPRAZOLE 40 MG/1
40 CAPSULE, DELAYED RELEASE ORAL 2 TIMES DAILY
COMMUNITY
End: 2019-10-26

## 2019-03-21 RX ORDER — OXYCODONE AND ACETAMINOPHEN 10; 325 MG/1; MG/1
1 TABLET ORAL
Qty: 110 TAB | Refills: 0 | Status: SHIPPED | OUTPATIENT
Start: 2019-04-03 | End: 2019-05-02 | Stop reason: SDUPTHER

## 2019-03-21 NOTE — PROGRESS NOTES
Referral date before 8/22/11, source self and for chronic low back pain, bilateral knee and foot pain. Calculated MEQ - 45  Naloxone rescue - yes  Prophylactic bowel program - prn  Date of last OCA 12/10/18  Last UDS 03/21/19, consistent POC, awaiting confirmatory testing   date checked today, findings consistent      Today   Last Visit  Prior Visit  ORT -    0     PGIC - 6 and 3 5 and 3    ELIANA -56%  42%   54%  COMM - 2  1   1      HPI:  Elizabeth Acosta is a 72 y.o. male here for f/u visit for ongoing evaluation ofchronic low back pain, bilateral knee and foot pain. Pt was last seen here on  12/10/18. Pt denies interval changes on the character or distribution of pain. Pain is located bilateral lower back, bilateral knees, bilateral lower extremities. The pain is described as  pins and needles to lower back, stabbing to bilateral thighs, pins and needles to bilateral knees. Pain at its best is 5/10. Pain at its worse is 10/10. The pain is worsened by standing, walking, any activity. Symptoms are improved by lying down,weightloss helped, TENS unit (numb the area where pain was, but didn't last long). Pt has tried heat, back injections, acupuncture, chiropractic with no perceived benefit. Since last visit, pt denies changes since last visit. Pt states \"he's not going that route\" in regards to surgery. Pt has not tried compound cream, SCS trial, intrathecal pain pump, massage therapy, physical therapy (only participated in 2 weeks 1 time unsure when). Since last visit, Pt was recently at [facility] with an ulcer and had a recent procedure on the 15th for ulcer and has another procedure for a restricted esophagus. ROS:  Positive findings include weakness recently on a clear diet, changes in vision, changes in hearing, dizziness due to change in diet.   Denies fever, chills, nausea, vomiting, diarrhea, constipation, abdominal pain, chest pain, shortness or breath/trouble breathing, trouble swallowing,  falls, bladder incontinence, bowel incontinence, depression, anxiety, suicidal ideations, homicidal ideations or alcohol use. Opioid specific risk: A. fib, diabetes, depression, gastric bypass history. Vitals:    03/21/19 0820   BP: 117/69   Resp: 16   Weight: 83 kg (183 lb)   Height: 6' 1.5\" (1.867 m)   PainSc:   7        Imaging:  \"\"\" 6/3/11 MRI spine lumbar without contrast  Impression:  1. At L4-5, endplate irregularity and endplate subcortical erosions. Considering the prior disc degeneration, continued progression of the degenerative process is greatly favored over discitis or osteomyelitis. If the patient had a physical exam or history that would be suspicious for infection, then contrast enhancement would be helpful. Stenosis at detailed above, which has progressed. 2. L5-S1 disc bulge, likely unchanged compared to prior exam.  3.  L3-4 mild progression based on prior report. \"\"\"\"     Labs:   BUN/Cr: \"\"\"\" 4/13/18   22/1.3\"\"\"  AST/ALT: \"\"\" 5/23/16   23/5\"\"\"      Physical exam:  AFVSS, no acute distress, normal body habitus. A&OXs 3. Normocephalic, atraumatic. Conjugate gaze, clear sclerae. Speech is clear and appropriate. Mood is appropriate and patient is cooperative. Gait and balance are within functional limits. Non-labored breathing. LE:   Patient unable to perform strength for right lower extremity is 5/5 for hip flexion, knee extension, dorsiflexion, extensor hallucis longus, plantar flexion. Patient unable to perform strength for left lower extremity is 5/5 for hip flexion, knee extension, dorsiflexion, extensor hallucis longus, plantar flexion. Sensation to light touch is intact for right L2-S2. Sensation to light touch is intact for left L2-S2. TTP bilateral piriformis muscle.     Primary Care Physician  Mk Bang  [address]  [phone]      PHQ -- .  3 most recent PHQ Screens 3/21/2019   Little interest or pleasure in doing things Not at all   Feeling down, depressed, irritable, or hopeless Not at all   Total Score PHQ 2 0   Trouble falling or staying asleep, or sleeping too much Not at all   Feeling tired or having little energy Not at all   Poor appetite, weight loss, or overeating Not at all   Feeling bad about yourself - or that you are a failure or have let yourself or your family down Not at all   Trouble concentrating on things such as school, work, reading, or watching TV Not at all   Moving or speaking so slowly that other people could have noticed; or the opposite being so fidgety that others notice Not at all   Thoughts of being better off dead, or hurting yourself in some way Not at all   PHQ 9 Score 0   How difficult have these problems made it for you to do your work, take care of your home and get along with others Not difficult at all         Assessment/Plan:     ICD-10-CM ICD-9-CM    1. Chronic bilateral low back pain, with sciatica presence unspecified M54.5 724.2     G89.29 338.29    2. Neural foraminal stenosis of lumbosacral spine M99.83 724.03    3. Neural foraminal stenosis of lumbar spine M99.83 724.02    4. Generalized OA M15.9 715.00    5. Chronic pain syndrome G89.4 338.4 oxyCODONE-acetaminophen (PERCOCET 10)  mg per tablet   6. Encounter for long-term (current) use of high-risk medication Z79.899 V58.69 DRUG SCREEN      AMB POC DRUG SCREEN ()      Patient given piriformis muscle stretching exercises to be added to his daily routine    Pt to trial OTC tylenol 500 mg 1-2 tab up to once daily as needed for pain. Max daily dose 1000 mg.      At last visit, Ordered back brace from [facility] to be worn as needed for back pain, patient unable to obtain insurance didn't cover. Do not recommend long term opioid therapy for this patient at this time for their chronic pain; the risks outweigh the potential benefits.  Pt currently taking oxycodone/acetaminophen 10/325 mg tablet 1 tablet up to 4 times daily with a total of 110 tablets be budgeted over 30 days. Their MME is 39. Today, we will hold the weaning of patients opioid medication with a goal of being opioid free, pending safety and compliance. Pt instructed to call if they experience any signs of withdrawal (diarrhea, nausea, vomiting, sweating or chills, agitation, itching). Today, pt given prescription for oxycodone/acetaminophen 10/325 mg tablet 1 tablet up to 4 times daily with a total of 110 tablets be budgeted over 30 days. Their new MME is 39. Patient has multiple upcoming doctor visits due to ulcers and esophageal stricture. We will keep his prescription the same and reassess at next office visit. Patient declines compound cream and physical therapy at this time      Consider massage therapy, physical therapy. Follow up ongoing assessment and ongoing development of integrative and comprehensive plan of care for chronic pain. Goals: To establish complementary and integrative plan of care to address chronic pain issues while minimizing pharmaceuticals to maximize patient's function improve quality of life. Education:  Patient again educated on the importance of strict compliance with the opioid care agreement while on opioid therapy. Patient also again educated that they should avoid driving while on chronic opioid therapy. Also advised to avoid alcohol and to avoid benzodiazepines while on opioid therapy. Handouts given regarding opioid safety and sleep health. Patient Homework:  Continue to independently investigate yoga for persons with chronic pain. Follow up in 3 months     [address] was used for portions of this report. Unintended errors may occur.     Social History     Socioeconomic History    Marital status:      Spouse name: Not on file    Number of children: Not on file    Years of education: Not on file    Highest education level: Not on file   Occupational History    Not on file Social Needs    Financial resource strain: Not on file    Food insecurity:     Worry: Not on file     Inability: Not on file    Transportation needs:     Medical: Not on file     Non-medical: Not on file   Tobacco Use    Smoking status: Former Smoker     Last attempt to quit: 1992     Years since quittin.2    Smokeless tobacco: Never Used   Substance and Sexual Activity    Alcohol use: Yes     Comment: wine    Drug use: No    Sexual activity: Not on file   Lifestyle    Physical activity:     Days per week: Not on file     Minutes per session: Not on file    Stress: Not on file   Relationships    Social connections:     Talks on phone: Not on file     Gets together: Not on file     Attends [employer] service: Not on file     Active member of club or organization: Not on file     Attends meetings of clubs or organizations: Not on file     Relationship status: Not on file    Intimate partner violence:     Fear of current or ex partner: Not on file     Emotionally abused: Not on file     Physically abused: Not on file     Forced sexual activity: Not on file   Other Topics Concern    Not on file   Social History Narrative    Not on file     Family History   Problem Relation Age of Onset    Diabetes Other     Hypertension Other     Other Other         gout     No Known Allergies  Past Medical History:   Diagnosis Date    Atrial fibrillation ([address]     CAD (coronary artery disease)     Chronic constipation     Chronic kidney disease     stage 3    Cortical age-related cataract of left eye 2016    Cortical age-related cataract of right eye 10/15/2016    Depression     Diabetes ([address]     no meds    Gout     feet and right elbow    Hypercholesterolemia     Hypertension     S/P CABG x 4     Sleep apnea     no cpap    [facility]     Unstable angina ([address] 2017     Past Surgical History:   Procedure Laterality Date    CARDIAC SURG PROCEDURE UNLIST  16    quad bypass  HX CATARACT REMOVAL Right 10/17/2016    HX CORONARY ARTERY BYPASS GRAFT       4 vessel CABG    HX CORONARY ARTERY BYPASS GRAFT  05/2016    HX CORONARY STENT PLACEMENT  04/2018    HX GASTRIC BYPASS  1997    HX HEENT      rt eye laser surgery    HX ORTHOPAEDIC      rt knee    LEFT HEART PERCUTANEOUS  6/1/2017          Current Outpatient Medications on File Prior to Visit   Medication Sig    omeprazole (PRILOSEC) 40 mg capsule Take 40 mg by mouth two (2) times a day.  Omega-3 Fatty Acids (FISH OIL) 500 mg cap Take 1 Cap by mouth daily.  raNITIdine (ZANTAC) 150 mg tablet Take 150 mg by mouth daily.  atorvastatin (LIPITOR) 40 mg tablet Take 40 mg by mouth daily.  hydrALAZINE (APRESOLINE) 25 mg tablet Take 25 mg by mouth three (3) times daily as needed.  NITROGLYCERIN PO Take  by mouth daily as needed.  chlorthalidone (HYGROTEN) 50 mg tablet Take 1 Tab by mouth daily.  furosemide (LASIX) 40 mg tablet Take 20 mg by mouth daily as needed.  allopurinol (ZYLOPRIM) 300 mg tablet Take 300 mg by mouth daily as needed.  OTHER Lift for Right Shoe to correct leg length discrepancy    cloNIDine (CATAPRES) 0.1 mg tablet Take 0.2 mg by mouth two (2) times daily as needed.  CARVEDILOL PO Take 25 mg by mouth two (2) times a day.  naloxone (NARCAN) 4 mg/actuation nasal spray Use 1 spray intranasally, then discard. Repeat with new spray every 2 min as needed for opioid overdose symptoms, alternating nostrils. No current facility-administered medications on file prior to visit.

## 2019-03-21 NOTE — PATIENT INSTRUCTIONS
Learning About Sleeping Well  What does sleeping well mean? Sleeping well means getting enough sleep. How much sleep is enough varies among people. The number of hours you sleep is not as important as how you feel when you wake up. If you do not feel refreshed, you probably need more sleep. Another sign of not getting enough sleep is feeling tired during the day. The average total nightly sleep time is 7½ to 8 hours. Healthy adults may need a little more or a little less than this. Why is getting enough sleep important? Getting enough quality sleep is a basic part of good health. When your sleep suffers, your mood and your thoughts can suffer too. You may find yourself feeling more grumpy or stressed. Not getting enough sleep also can lead to serious problems, including injury, accidents, anxiety, and depression. What might cause poor sleeping? Many things can cause sleep problems, including:  · Stress. Stress can be caused by fear about a single event, such as giving a speech. Or you may have ongoing stress, such as worry about work or school. · Depression, anxiety, and other mental or emotional conditions. · Changes in your sleep habits or surroundings. This includes changes that happen where you sleep, such as noise, light, or sleeping in a different bed. It also includes changes in your sleep pattern, such as having jet lag or working a late shift. · Health problems, such as pain, breathing problems, and restless legs syndrome. · Lack of regular exercise. How can you help yourself? Here are some tips that may help you sleep more soundly and wake up feeling more refreshed. Your sleeping area  · Use your bedroom only for sleeping and sex. A bit of light reading may help you fall asleep. But if it doesn't, do your reading elsewhere in the house. Don't watch TV in bed. · Be sure your bed is big enough to stretch out comfortably, especially if you have a sleep partner.   · Keep your bedroom quiet, dark, and cool. Use curtains, blinds, or a sleep mask to block out light. To block out noise, use earplugs, soothing music, or a \"white noise\" machine. Your evening and bedtime routine  · Create a relaxing bedtime routine. You might want to take a warm shower or bath, listen to soothing music, or drink a cup of noncaffeinated tea. · Go to bed at the same time every night. And get up at the same time every morning, even if you feel tired. What to avoid  · Limit caffeine (coffee, tea, caffeinated sodas) during the day, and don't have any for at least 4 to 6 hours before bedtime. · Don't drink alcohol before bedtime. Alcohol can cause you to wake up more often during the night. · Don't smoke or use tobacco, especially in the evening. Nicotine can keep you awake. · Don't take naps during the day, especially close to bedtime. · Don't lie in bed awake for too long. If you can't fall asleep, or if you wake up in the middle of the night and can't get back to sleep within 15 minutes or so, get out of bed and go to another room until you feel sleepy. · Don't take medicine right before bed that may keep you awake or make you feel hyper or energized. Your doctor can tell you if your medicine may do this and if you can take it earlier in the day. If you can't sleep  · Imagine yourself in a peaceful, pleasant scene. Focus on the details and feelings of being in a place that is relaxing. · Get up and do a quiet or boring activity until you feel sleepy. · Don't drink any liquids after 6 p.m. if you wake up often because you have to go to the bathroom. Where can you learn more? Go to http://selina-trinity.info/. Enter J152 in the search box to learn more about \"Learning About Sleeping Well. \"  Current as of: September 11, 2018  Content Version: 11.9  © 8646-9598 [employer], "[employer].  Care instructions adapted under license by [employer] (which disclaims liability or warranty for this information). If you have questions about a medical condition or this instruction, always ask your healthcare professional. [address] any warranty or liability for your use of this information. Safe Use of Opioid Pain Medicine: Care Instructions  Your Care Instructions  Pain is your body's way of warning you that something is wrong. Pain feels different for everybody. Only you can describe your pain. A doctor can suggest or prescribe many types of medicines for pain. These range from over-the-counter medicines like acetaminophen (Tylenol) to powerful medicines called opioids. Examples of opioids are fentanyl, hydrocodone, morphine, and oxycodone. Heroin is an illegal opioid  Opioids are strong medicines. They can help you manage pain when you use them the right way. But if you misuse them, they can cause serious harm and even death. For these reasons, doctors are very careful about how they prescribe opioids. If you decide to take opioids, here are some things to remember. · Keep your doctor informed. You can get addicted to opioids. The risk is higher if you have a history of substance use. Your doctor will monitor you closely for signs of misuse and addiction and to figure out when you no longer need to take opioids. · Make a treatment plan. The goal of your plan is to be able to function and do the things you need to do, even if you still have some pain. You might be able to manage your pain with other non-opioid options like physical therapy, relaxation, or over-the-counter pain medicines. · Be aware of the side effects. Opioids can cause serious side effects, such as constipation, dry mouth, and nausea. And over time, you may need a higher dose to get pain relief. This is called tolerance. Your body also gets used to opioids. This is called physical dependence. If you suddenly stop taking them, you may have withdrawal symptoms.   The doctor carefully considered what pain medicine is right for you. You may not have received opioids if your doctor was concerned about drug interactions or your safety, or if he or she had other concerns. It is best to have one doctor or clinic treat your pain. This way you will get the pain medicine that will help you the most. And a doctor will be able to watch for any problems that the medicine might cause. The doctor has checked you carefully, but problems can develop later. If you notice any problems or new symptoms,  get medical treatment right away. Follow-up care is a key part of your treatment and safety. Be sure to make and go to all appointments, and call your doctor if you are having problems. It's also a good idea to know your test results and keep a list of the medicines you take. How can you care for yourself at home? If you need to take opioids to manage your pain, remember these safety tips. · Follow directions carefully. It's easy to misuse opioids if you take a dose other than what's prescribed by your doctor. This can lead to overdose and even death. Even sharing them with someone they weren't meant for is misuse. · Be cautious. Opioids may affect your judgment and decision making. Do not drive or operate machinery until you can think clearly. Talk with your doctor about when it is safe to drive. · Reduce the risk of drug interactions. Opioids can be dangerous if you take them with alcohol or with certain drugs like sleeping pills and muscle relaxers. Make sure your doctor knows about all the other medicines you take, including over-the-counter medicines. Don't start any new medicines before you talk to your doctor or pharmacist.  · Safely store and dispose of opioids. Store opioids in a safe and secure place. Make sure that pets, children, friends, and family can't get to them. When you're done using opioids, make sure to dispose of them safely and as quickly as possible. The U.[place].  Food and Drug Administration (FDA) recommends these disposal options. ? The best option is to take your medicine to a drop-off box or take-back program that is authorized by the [address] (EDILIA). ? If these programs aren't available in your area and your medicine doesn't have specific disposal instructions (such as flushing), you can throw them into your household trash if you follow the FDA's instructions. Visit fda.gov and search for \"unused medicine disposal.\"  ? If you have opioid patches (used or unused), your options are to take them to a EDILIA-authorized site or flush them down the toilet. Do not throw them in the trash. ? Only flush your medicine down the toilet if you can't get to a EDILIA-approved site or your medicine instructions state clearly to flush them. · Reduce the risk of overdose. Misuse of opioids can be very dangerous. Protect yourself by asking your doctor about a naloxone rescue kit. It can help you--and even save your life--if you take too much of an opioid. Try other ways to reduce pain. · Relax, and reduce stress. Relaxation techniques such as deep breathing or meditation can help. · Keep moving. Gentle, daily exercise can help reduce pain over the long run. Try low- or no-impact exercises such as walking, swimming, and stationary biking. Do stretches to stay flexible. · Try heat, cold packs, and massage. · Get enough sleep. Pain can make you tired and drain your energy. Talk with your doctor if you have trouble sleeping because of pain. · Think positive. Your thoughts can affect your pain level. Do things that you enjoy to distract yourself when you have pain instead of focusing on the pain. See a movie, read a book, listen to music, or spend time with a friend. If you are not taking a prescription pain medicine, ask your doctor if you can take an over-the-counter medicine. When should you call for help?   Call your doctor now or seek immediate medical care if:    · You have a new kind of pain.     · You have new symptoms, such as a fever or rash, along with the pain.    Watch closely for changes in your health, and be sure to contact your doctor if:    · You think you might be using too much pain medicine, and you need help to use less or stop.     · Your pain gets worse.     · You would like a referral to a doctor or clinic that specializes in pain management. Where can you learn more? Go to http://selina-trinity.info/. Enter R108 in the search box to learn more about \"Safe Use of Opioid Pain Medicine: Care Instructions. \"  Current as of: Rhonda 3, 2018  Content Version: 11.9  © 0364-8706 [employer]. Care instructions adapted under license by [employer] (which disclaims liability or warranty for this information). If you have questions about a medical condition or this instruction, always ask your healthcare professional. [place] [address] any warranty or liability for your use of this information. Safe Use of Opioid Pain Medicine: Care Instructions  Your Care Instructions  Pain is your body's way of warning you that something is wrong. Pain feels different for everybody. Only you can describe your pain. A doctor can suggest or prescribe many types of medicines for pain. These range from over-the-counter medicines like acetaminophen (Tylenol) to powerful medicines called opioids. Examples of opioids are fentanyl, hydrocodone, morphine, and oxycodone. Heroin is an illegal opioid  Opioids are strong medicines. They can help you manage pain when you use them the right way. But if you misuse them, they can cause serious harm and even death. For these reasons, doctors are very careful about how they prescribe opioids. If you decide to take opioids, here are some things to remember. · Keep your doctor informed. You can get addicted to opioids. The risk is higher if you have a history of substance use.  Your doctor will monitor you closely for signs of misuse and addiction and to figure out when you no longer need to take opioids. · Make a treatment plan. The goal of your plan is to be able to function and do the things you need to do, even if you still have some pain. You might be able to manage your pain with other non-opioid options like physical therapy, relaxation, or over-the-counter pain medicines. · Be aware of the side effects. Opioids can cause serious side effects, such as constipation, dry mouth, and nausea. And over time, you may need a higher dose to get pain relief. This is called tolerance. Your body also gets used to opioids. This is called physical dependence. If you suddenly stop taking them, you may have withdrawal symptoms. The doctor carefully considered what pain medicine is right for you. You may not have received opioids if your doctor was concerned about drug interactions or your safety, or if he or she had other concerns. It is best to have one doctor or clinic treat your pain. This way you will get the pain medicine that will help you the most. And a doctor will be able to watch for any problems that the medicine might cause. The doctor has checked you carefully, but problems can develop later. If you notice any problems or new symptoms,  get medical treatment right away. Follow-up care is a key part of your treatment and safety. Be sure to make and go to all appointments, and call your doctor if you are having problems. It's also a good idea to know your test results and keep a list of the medicines you take. How can you care for yourself at home? If you need to take opioids to manage your pain, remember these safety tips. · Follow directions carefully. It's easy to misuse opioids if you take a dose other than what's prescribed by your doctor. This can lead to overdose and even death. Even sharing them with someone they weren't meant for is misuse. · Be cautious.  Opioids may affect your judgment and decision making. Do not drive or operate machinery until you can think clearly. Talk with your doctor about when it is safe to drive. · Reduce the risk of drug interactions. Opioids can be dangerous if you take them with alcohol or with certain drugs like sleeping pills and muscle relaxers. Make sure your doctor knows about all the other medicines you take, including over-the-counter medicines. Don't start any new medicines before you talk to your doctor or pharmacist.  · Safely store and dispose of opioids. Store opioids in a safe and secure place. Make sure that pets, children, friends, and family can't get to them. When you're done using opioids, make sure to dispose of them safely and as quickly as possible. The U.S. Food and Drug Administration (FDA) recommends these disposal options. ? The best option is to take your medicine to a drop-off box or take-back program that is authorized by the [employer] (EDILIA). ? If these programs aren't available in your area and your medicine doesn't have specific disposal instructions (such as flushing), you can throw them into your household trash if you follow the FDA's instructions. Visit fda.gov and search for \"unused medicine disposal.\"  ? If you have opioid patches (used or unused), your options are to take them to a EDILIA-authorized site or flush them down the toilet. Do not throw them in the trash. ? Only flush your medicine down the toilet if you can't get to a EDILIA-approved site or your medicine instructions state clearly to flush them. · Reduce the risk of overdose. Misuse of opioids can be very dangerous. Protect yourself by asking your doctor about a naloxone rescue kit. It can help you--and even save your life--if you take too much of an opioid. Try other ways to reduce pain. · Relax, and reduce stress. Relaxation techniques such as deep breathing or meditation can help. · Keep moving.  Gentle, daily exercise can help reduce pain over the long run. Try low- or no-impact exercises such as walking, swimming, and stationary biking. Do stretches to stay flexible. · Try heat, cold packs, and massage. · Get enough sleep. Pain can make you tired and drain your energy. Talk with your doctor if you have trouble sleeping because of pain. · Think positive. Your thoughts can affect your pain level. Do things that you enjoy to distract yourself when you have pain instead of focusing on the pain. See a movie, read a book, listen to music, or spend time with a friend. If you are not taking a prescription pain medicine, ask your doctor if you can take an over-the-counter medicine. When should you call for help? Call your doctor now or seek immediate medical care if:    · You have a new kind of pain.     · You have new symptoms, such as a fever or rash, along with the pain.    Watch closely for changes in your health, and be sure to contact your doctor if:    · You think you might be using too much pain medicine, and you need help to use less or stop.     · Your pain gets worse.     · You would like a referral to a doctor or clinic that specializes in pain management. Where can you learn more? Go to http://selina-trinity.info/. Enter R108 in the search box to learn more about \"Safe Use of Opioid Pain Medicine: Care Instructions. \"  Current as of: Rhonda 3, 2018  Content Version: 11.9  © 6537-3758 [employer]. Care instructions adapted under license by [employer] (which disclaims liability or warranty for this information). If you have questions about a medical condition or this instruction, always ask your healthcare professional. [address] any warranty or liability for your use of this information.

## 2019-03-21 NOTE — PROGRESS NOTES
Nursing Notes    Patient presents to the office today in follow-up. Patient rates his pain at 7/10 on the numerical pain scale. Reviewed medications with counts as follows:    Rx Date filled Qty Dispensed Pill Count Last Dose Short   Percocet 10/325 mg 03/04/19 110 38 today no                                       reviewed YES  Any aberrancies noted on  NO  Last opioid agreement 11/28/18  Last urine drug screen 10/29/18-due today    Comments:     POC UDS was performed in office today. VORB    Any new labs or imaging since last appointment? YES. Pt had some labs done. All results in [facility]    Have you been to an emergency room (ER) or urgent care clinic since your last visit? NO            Have you been hospitalized since your last visit? NO     If yes, where, when, and reason for visit? Pt had a procedure to stretch his esophagus done as an out pt    Have you seen or consulted any other health care providers outside of the [facility]  since your last visit? YES     If yes, where, when, and reason for visit? GI  Mr. Suman Michaud has a reminder for a \"due or due soon\" health maintenance. I have asked that he contact his primary care provider for follow-up on this health maintenance.     3 most recent PHQ Screens 3/21/2019   Little interest or pleasure in doing things Not at all   Feeling down, depressed, irritable, or hopeless Not at all   Total Score PHQ 2 0   Trouble falling or staying asleep, or sleeping too much Not at all   Feeling tired or having little energy Not at all   Poor appetite, weight loss, or overeating Not at all   Feeling bad about yourself - or that you are a failure or have let yourself or your family down Not at all   Trouble concentrating on things such as school, work, reading, or watching TV Not at all   Moving or speaking so slowly that other people could have noticed; or the opposite being so fidgety that others notice Not at all   Thoughts of being better off dead, or hurting yourself in some way Not at all   PHQ 9 Score 0   How difficult have these problems made it for you to do your work, take care of your home and get along with others Not difficult at all     Abuse Screening Questionnaire 3/21/2019   Do you ever feel afraid of your partner? N   Are you in a relationship with someone who physically or mentally threatens you? N   Is it safe for you to go home?  Hollis Elizabeth

## 2019-03-22 ENCOUNTER — ANESTHESIA EVENT (OUTPATIENT)
Dept: ENDOSCOPY | Age: 74
End: 2019-03-22
Payer: MEDICARE

## 2019-03-25 ENCOUNTER — ANESTHESIA (OUTPATIENT)
Dept: ENDOSCOPY | Age: 74
End: 2019-03-25
Payer: MEDICARE

## 2019-03-25 ENCOUNTER — HOSPITAL ENCOUNTER (OUTPATIENT)
Age: 74
Setting detail: OUTPATIENT SURGERY
Discharge: HOME | End: 2019-03-25
Attending: INTERNAL MEDICINE | Admitting: INTERNAL MEDICINE
Payer: MEDICARE

## 2019-03-25 VITALS
HEIGHT: 74 IN | HEART RATE: 64 BPM | BODY MASS INDEX: 23.87 KG/M2 | WEIGHT: 186 LBS | TEMPERATURE: 97 F | DIASTOLIC BLOOD PRESSURE: 74 MMHG | RESPIRATION RATE: 16 BRPM | OXYGEN SATURATION: 98 % | SYSTOLIC BLOOD PRESSURE: 151 MMHG

## 2019-03-25 LAB
ANION GAP SERPL CALC-SCNC: 4 MMOL/L (ref 3–18)
ATRIAL RATE: 66 BPM
BUN BLD-MCNC: 18 MG/DL (ref 7–18)
BUN SERPL-MCNC: 20 MG/DL (ref 7–18)
BUN/CREAT SERPL: 12 (ref 12–20)
CALCIUM SERPL-MCNC: 8.6 MG/DL (ref 8.5–10.1)
CALCULATED R AXIS, ECG10: -60 DEGREES
CALCULATED T AXIS, ECG11: 92 DEGREES
CHLORIDE BLD-SCNC: 104 MMOL/L (ref 100–108)
CHLORIDE SERPL-SCNC: 108 MMOL/L (ref 100–108)
CO2 SERPL-SCNC: 29 MMOL/L (ref 21–32)
CREAT SERPL-MCNC: 1.62 MG/DL (ref 0.6–1.3)
DIAGNOSIS, 93000: NORMAL
GLUCOSE BLD STRIP.AUTO-MCNC: 39 MG/DL (ref 70–110)
GLUCOSE BLD STRIP.AUTO-MCNC: 45 MG/DL (ref 70–110)
GLUCOSE BLD STRIP.AUTO-MCNC: 57 MG/DL (ref 70–110)
GLUCOSE BLD STRIP.AUTO-MCNC: 61 MG/DL (ref 74–106)
GLUCOSE BLD STRIP.AUTO-MCNC: 72 MG/DL (ref 70–110)
GLUCOSE BLD STRIP.AUTO-MCNC: 90 MG/DL (ref 70–110)
GLUCOSE SERPL-MCNC: 57 MG/DL (ref 74–99)
HCT VFR BLD CALC: 34 % (ref 36–49)
HGB BLD-MCNC: 11.6 G/DL (ref 12–16)
P-R INTERVAL, ECG05: 280 MS
POTASSIUM BLD-SCNC: 4 MMOL/L (ref 3.5–5.5)
POTASSIUM SERPL-SCNC: 3.8 MMOL/L (ref 3.5–5.5)
Q-T INTERVAL, ECG07: 420 MS
QRS DURATION, ECG06: 116 MS
QTC CALCULATION (BEZET), ECG08: 440 MS
SODIUM BLD-SCNC: 145 MMOL/L (ref 136–145)
SODIUM SERPL-SCNC: 141 MMOL/L (ref 136–145)
VENTRICULAR RATE, ECG03: 66 BPM

## 2019-03-25 PROCEDURE — 74011250636 HC RX REV CODE- 250/636

## 2019-03-25 PROCEDURE — 77030031670 HC DEV INFL ENDOTEK BIG60 MRTM -B: Performed by: INTERNAL MEDICINE

## 2019-03-25 PROCEDURE — 76060000032 HC ANESTHESIA 0.5 TO 1 HR: Performed by: INTERNAL MEDICINE

## 2019-03-25 PROCEDURE — 74011000250 HC RX REV CODE- 250: Performed by: NURSE ANESTHETIST, CERTIFIED REGISTERED

## 2019-03-25 PROCEDURE — C1726 CATH, BAL DIL, NON-VASCULAR: HCPCS | Performed by: INTERNAL MEDICINE

## 2019-03-25 PROCEDURE — 80048 BASIC METABOLIC PNL TOTAL CA: CPT

## 2019-03-25 PROCEDURE — 74011250636 HC RX REV CODE- 250/636: Performed by: NURSE ANESTHETIST, CERTIFIED REGISTERED

## 2019-03-25 PROCEDURE — 74011000250 HC RX REV CODE- 250

## 2019-03-25 PROCEDURE — 88305 TISSUE EXAM BY PATHOLOGIST: CPT

## 2019-03-25 PROCEDURE — 77030018846 HC SOL IRR STRL H20 ICUM -A: Performed by: INTERNAL MEDICINE

## 2019-03-25 PROCEDURE — 76040000007: Performed by: INTERNAL MEDICINE

## 2019-03-25 PROCEDURE — 93005 ELECTROCARDIOGRAM TRACING: CPT

## 2019-03-25 PROCEDURE — 82947 ASSAY GLUCOSE BLOOD QUANT: CPT

## 2019-03-25 PROCEDURE — 77030008565 HC TBNG SUC IRR ERBE -B: Performed by: INTERNAL MEDICINE

## 2019-03-25 PROCEDURE — 82962 GLUCOSE BLOOD TEST: CPT

## 2019-03-25 PROCEDURE — 77030019988 HC FCPS ENDOSC DISP BSC -B: Performed by: INTERNAL MEDICINE

## 2019-03-25 RX ORDER — INSULIN LISPRO 100 [IU]/ML
INJECTION, SOLUTION INTRAVENOUS; SUBCUTANEOUS ONCE
Status: DISCONTINUED | OUTPATIENT
Start: 2019-03-25 | End: 2019-03-25 | Stop reason: HOSPADM

## 2019-03-25 RX ORDER — PROPOFOL 10 MG/ML
INJECTION, EMULSION INTRAVENOUS AS NEEDED
Status: DISCONTINUED | OUTPATIENT
Start: 2019-03-25 | End: 2019-03-25 | Stop reason: HOSPADM

## 2019-03-25 RX ORDER — SODIUM CHLORIDE 0.9 % (FLUSH) 0.9 %
5-40 SYRINGE (ML) INJECTION EVERY 8 HOURS
Status: DISCONTINUED | OUTPATIENT
Start: 2019-03-25 | End: 2019-03-25 | Stop reason: HOSPADM

## 2019-03-25 RX ORDER — DEXTROSE 50 % IN WATER (D50W) INTRAVENOUS SYRINGE
Status: COMPLETED
Start: 2019-03-25 | End: 2019-03-25

## 2019-03-25 RX ORDER — DEXTROSE 50 % IN WATER (D50W) INTRAVENOUS SYRINGE
25-50 AS NEEDED
Status: DISCONTINUED | OUTPATIENT
Start: 2019-03-25 | End: 2019-03-25 | Stop reason: HOSPADM

## 2019-03-25 RX ORDER — SODIUM CHLORIDE, SODIUM LACTATE, POTASSIUM CHLORIDE, CALCIUM CHLORIDE 600; 310; 30; 20 MG/100ML; MG/100ML; MG/100ML; MG/100ML
INJECTION, SOLUTION INTRAVENOUS
Status: DISCONTINUED | OUTPATIENT
Start: 2019-03-25 | End: 2019-03-25 | Stop reason: HOSPADM

## 2019-03-25 RX ORDER — MAGNESIUM SULFATE 100 %
4 CRYSTALS MISCELLANEOUS AS NEEDED
Status: DISCONTINUED | OUTPATIENT
Start: 2019-03-25 | End: 2019-03-25 | Stop reason: HOSPADM

## 2019-03-25 RX ORDER — SODIUM CHLORIDE, SODIUM LACTATE, POTASSIUM CHLORIDE, CALCIUM CHLORIDE 600; 310; 30; 20 MG/100ML; MG/100ML; MG/100ML; MG/100ML
50 INJECTION, SOLUTION INTRAVENOUS CONTINUOUS
Status: DISCONTINUED | OUTPATIENT
Start: 2019-03-25 | End: 2019-03-25 | Stop reason: HOSPADM

## 2019-03-25 RX ORDER — DEXTROSE 50 % IN WATER (D50W) INTRAVENOUS SYRINGE
Status: DISCONTINUED
Start: 2019-03-25 | End: 2019-03-25 | Stop reason: HOSPADM

## 2019-03-25 RX ORDER — SODIUM CHLORIDE 0.9 % (FLUSH) 0.9 %
5-40 SYRINGE (ML) INJECTION AS NEEDED
Status: DISCONTINUED | OUTPATIENT
Start: 2019-03-25 | End: 2019-03-25 | Stop reason: HOSPADM

## 2019-03-25 RX ORDER — SODIUM CHLORIDE 9 MG/ML
25 INJECTION, SOLUTION INTRAVENOUS CONTINUOUS
Status: DISCONTINUED | OUTPATIENT
Start: 2019-03-25 | End: 2019-03-25 | Stop reason: HOSPADM

## 2019-03-25 RX ORDER — DEXTROSE 50 % IN WATER (D50W) INTRAVENOUS SYRINGE
12.5 ONCE
Status: COMPLETED | OUTPATIENT
Start: 2019-03-25 | End: 2019-03-25

## 2019-03-25 RX ADMIN — PROPOFOL 50 MG: 10 INJECTION, EMULSION INTRAVENOUS at 13:24

## 2019-03-25 RX ADMIN — PROPOFOL 50 MG: 10 INJECTION, EMULSION INTRAVENOUS at 13:21

## 2019-03-25 RX ADMIN — DEXTROSE MONOHYDRATE 6.25 G: 500 INJECTION PARENTERAL at 12:06

## 2019-03-25 RX ADMIN — PROPOFOL 50 MG: 10 INJECTION, EMULSION INTRAVENOUS at 13:33

## 2019-03-25 RX ADMIN — FAMOTIDINE 20 MG: 10 INJECTION INTRAVENOUS at 12:42

## 2019-03-25 RX ADMIN — DEXTROSE MONOHYDRATE 12.5 G: 25 INJECTION, SOLUTION INTRAVENOUS at 13:54

## 2019-03-25 RX ADMIN — DEXTROSE 50 % IN WATER (D50W) INTRAVENOUS SYRINGE 6.25 G: at 12:06

## 2019-03-25 RX ADMIN — SODIUM CHLORIDE, SODIUM LACTATE, POTASSIUM CHLORIDE, CALCIUM CHLORIDE: 600; 310; 30; 20 INJECTION, SOLUTION INTRAVENOUS at 13:09

## 2019-03-25 RX ADMIN — PROPOFOL 50 MG: 10 INJECTION, EMULSION INTRAVENOUS at 13:14

## 2019-03-25 RX ADMIN — PROPOFOL 50 MG: 10 INJECTION, EMULSION INTRAVENOUS at 13:29

## 2019-03-25 RX ADMIN — SODIUM CHLORIDE 25 ML/HR: 900 INJECTION, SOLUTION INTRAVENOUS at 11:30

## 2019-03-25 RX ADMIN — PROPOFOL 50 MG: 10 INJECTION, EMULSION INTRAVENOUS at 13:17

## 2019-03-25 NOTE — ANESTHESIA PREPROCEDURE EVALUATION
Relevant Problems No relevant active problems Anesthetic History No history of anesthetic complications Review of Systems / Medical History Patient summary reviewed and pertinent labs reviewed Pulmonary Sleep apnea Neuro/Psych CVA 
TIA and psychiatric history Cardiovascular Hypertension Dysrhythmias CAD and CABG Exercise tolerance: <4 METS 
  
GI/Hepatic/Renal 
  
 
 
 
 
 
 Endo/Other Diabetes Arthritis Other Findings Comments: Very weak walks with cane Physical Exam 
 
Airway Mallampati: I 
TM Distance: > 6 cm Neck ROM: normal range of motion Mouth opening: Normal 
 
 Cardiovascular Rhythm: regular Rate: abnormal 
 
 
 
 Dental 
 
Dentition: Poor dentition Pulmonary Decreased breath sounds Abdominal 
GI exam deferred Other Findings Anesthetic Plan ASA: 3 Anesthesia type: MAC Anesthetic plan and risks discussed with: Patient

## 2019-03-25 NOTE — H&P
[address] [facility], [address] History and Physical reviewed; I have examined the patient and there are no pertinent changes. Annie Barnett MD, MD  
1:12 PM 3/25/2019 [facility], [address] [place] [address] 
[email or web address]. [facility]

## 2019-03-25 NOTE — ANESTHESIA POSTPROCEDURE EVALUATION
Procedure(s): UPPER ENDOSCOPY w balloon dilation. MAC Anesthesia Post Evaluation Multimodal analgesia: multimodal analgesia used between 6 hours prior to anesthesia start to PACU discharge Patient location during evaluation: bedside Patient participation: complete - patient participated Level of consciousness: awake Pain management: adequate Airway patency: patent Anesthetic complications: no 
Cardiovascular status: stable Respiratory status: acceptable Hydration status: acceptable Post anesthesia nausea and vomiting:  controlled Vitals Value Taken Time /68 3/25/2019  2:21 PM  
Temp 36.4 °C (97.5 °F) 3/25/2019  1:43 PM  
Pulse 65 3/25/2019  2:28 PM  
Resp 19 3/25/2019  2:28 PM  
SpO2 100 % 3/25/2019  2:28 PM  
Vitals shown include unvalidated device data.

## 2019-03-25 NOTE — DISCHARGE INSTRUCTIONS
Esophageal Dilation: What to Expect at [address] [place] [address]  After you have esophageal dilation, you will stay at the hospital or surgery center for 1 to 2 hours. This will allow the medicine to wear off. You will be able to go home after your doctor or nurse checks to make sure you are not having any problems. This care sheet gives you a general idea about how long it will take for you to recover. But each person recovers at a different pace. Follow the steps below to get better as quickly as possible. How can you care for yourself at home? Activity    · Rest as much as you need to after you go home.     · You should be able to go back to your usual activities the day after the procedure. Diet    · Follow your doctor's directions for eating after the procedure.     · Drink plenty of fluids (unless your doctor has told you not to). Medicines    · Your doctor will tell you if and when you can restart your medicines. He or she will also give you instructions about taking any new medicines.     · If you take blood thinners, such as warfarin (Coumadin), clopidogrel (Plavix), or aspirin, be sure to talk to your doctor. He or she will tell you if and when to start taking those medicines again. Make sure that you understand exactly what your doctor wants you to do.     · If you have a sore throat the day after the procedure, use an over-the-counter spray to numb your throat. Sucking on throat lozenges and gargling with warm salt water may also help relieve your symptoms. Follow-up care is a key part of your treatment and safety. Be sure to make and go to all appointments, and call your doctor if you are having problems. It's also a good idea to know your test results and keep a list of the medicines you take. When should you call for help? Call 911 anytime you think you may need emergency care.  For example, call if:    · You passed out (lost consciousness).     · You have trouble breathing.     · Your stools are maroon or very bloody    Call your doctor now or seek immediate medical care if:    · You have new or worse belly pain.     · You have a fever.     · You have new or more blood in your stools.     · You are sick to your stomach and cannot drink fluids.     · You cannot pass stools or gas.     · You have pain that does not get better after you take pain medicine.    Watch closely for changes in your health, and be sure to contact your doctor if:    · Your throat still hurts after a day or two.     · You do not get better as expected. Where can you learn more? Go to http://selina-trinity.info/. Enter B912 in the search box to learn more about \"Esophageal Dilation: What to Expect at Home. \"  Current as of: March 27, 2018  Content Version: 11.9  © 0280-8960 "[employer]. Care instructions adapted under license by [employer] (which disclaims liability or warranty for this information). If you have questions about a medical condition or this instruction, always ask your healthcare professional. [place] [address] any warranty or liability for your use of this information. DISCHARGE SUMMARY from Nurse    PATIENT INSTRUCTIONS:    After general anesthesia or intravenous sedation, for 24 hours or while taking prescription Narcotics:  · Limit your activities  · Do not drive and operate hazardous machinery  · Do not make important personal or business decisions  · Do  not drink alcoholic beverages  · If you have not urinated within 8 hours after discharge, please contact your surgeon on call.     Report the following to your surgeon:  · Excessive pain, swelling, redness or odor of or around the surgical area  · Temperature over 100.5  · Nausea and vomiting lasting longer than 4 hours or if unable to take medications  · Any signs of decreased circulation or nerve impairment to extremity: change in color, persistent  numbness, tingling, coldness or increase pain  · Any questions    *  Please give a list of your current medications to your Primary Care Provider. *  Please update this list whenever your medications are discontinued, doses are      changed, or new medications (including over-the-counter products) are added. *  Please carry medication information at all times in case of emergency situations. These are general instructions for a healthy lifestyle:    No smoking/ No tobacco products/ Avoid exposure to second hand smoke  Surgeon General's Warning:  Quitting smoking now greatly reduces serious risk to your health. Obesity, smoking, and sedentary lifestyle greatly increases your risk for illness    A healthy diet, regular physical exercise & weight monitoring are important for maintaining a healthy lifestyle    You may be retaining fluid if you have a history of heart failure or if you experience any of the following symptoms:  Weight gain of 3 pounds or more overnight or 5 pounds in a week, increased swelling in our hands or feet or shortness of breath while lying flat in bed. Please call your doctor as soon as you notice any of these symptoms; do not wait until your next office visit. Recognize signs and symptoms of STROKE:    F-face looks uneven    A-arms unable to move or move unevenly    S-speech slurred or non-existent    T-time-call 911 as soon as signs and symptoms begin-DO NOT go       Back to bed or wait to see if you get better-TIME IS BRAIN. Warning Signs of HEART ATTACK     Call 911 if you have these symptoms:   Chest discomfort. Most heart attacks involve discomfort in the center of the chest that lasts more than a few minutes, or that goes away and comes back. It can feel like uncomfortable pressure, squeezing, fullness, or pain.  Discomfort in other areas of the upper body. Symptoms can include pain or discomfort in one or both arms, the back, neck, jaw, or stomach.  Shortness of breath with or without chest discomfort.    Other signs may include breaking out in a cold sweat, nausea, or lightheadedness. Don't wait more than five minutes to call 911 - MINUTES MATTER! Fast action can save your life. Calling 911 is almost always the fastest way to get lifesaving treatment. Emergency Medical Services staff can begin treatment when they arrive -- up to an hour sooner than if someone gets to the hospital by car. The discharge information has been reviewed with the patient and spouse. The patient and spouse verbalized understanding. Discharge medications reviewed with the patient and spouse and appropriate educational materials and side effects teaching were provided.   ___________________________________________________________________________________________________________________________________

## 2019-05-02 DIAGNOSIS — G89.4 CHRONIC PAIN SYNDROME: ICD-10-CM

## 2019-05-02 RX ORDER — OXYCODONE AND ACETAMINOPHEN 10; 325 MG/1; MG/1
1 TABLET ORAL
Qty: 110 TAB | Refills: 0 | Status: SHIPPED | OUTPATIENT
Start: 2019-05-02 | End: 2019-05-29 | Stop reason: SDUPTHER

## 2019-05-02 NOTE — TELEPHONE ENCOUNTER
Mumtaz Esparza has called requesting a refill of their controlled medication, oxycodone, for the management of back pain. Last office visit date: 3/21/19 with Poppy Mitchell, next 6/14/19 with Poppy Mitchell  Last opioid care agreement 5/3/19  Last UDS was done 3/25/19    Date last  was pulled and reviewed : 5/3/19  Last fill date for medication was 4/3/19    Was the patient compliant when the above report was pulled? yes    Analgesia: 40%    Aberrancies: 5/2/19, UDS 3/21/19 (+) Morphine 21,498ng/ml w/metabolite - noted last filled 12/27/18. ADL's: yes    Adverse Reaction: no    Provider's last note and plan of care reviewed? yes  Request forwarded to provider for review.

## 2019-05-02 NOTE — TELEPHONE ENCOUNTER
Patient called about medication refill(Oxycodone)    40%  Yes  No    Please give patient a call [phone]

## 2019-05-03 NOTE — TELEPHONE ENCOUNTER
Patient identified using two patient identifiers (name and ); patient advised prescriptions are ready for pick-up. Patient\"s wife Kylie Quinones will be picking up prescription, who is on HIPPA from. Informed pick hours are Monday-Friday 0730 till .

## 2019-05-15 PROBLEM — R41.0 DISORIENTATION: Status: ACTIVE | Noted: 2019-05-15

## 2019-05-15 PROBLEM — Z95.5 S/P DRUG ELUTING CORONARY STENT PLACEMENT: Status: ACTIVE | Noted: 2018-04-12

## 2019-05-15 PROBLEM — E66.09 NON MORBID OBESITY DUE TO EXCESS CALORIES: Status: ACTIVE | Noted: 2019-05-15

## 2019-05-15 PROBLEM — R47.1 DYSARTHRIA: Status: ACTIVE | Noted: 2019-05-15

## 2019-05-29 DIAGNOSIS — G89.4 CHRONIC PAIN SYNDROME: ICD-10-CM

## 2019-05-29 RX ORDER — OXYCODONE AND ACETAMINOPHEN 10; 325 MG/1; MG/1
1 TABLET ORAL
Qty: 110 TAB | Refills: 0 | Status: SHIPPED | OUTPATIENT
Start: 2019-06-01 | End: 2019-07-01

## 2019-06-18 ENCOUNTER — OFFICE VISIT (OUTPATIENT)
Dept: PAIN MANAGEMENT | Age: 74
End: 2019-06-18

## 2019-06-18 VITALS
HEIGHT: 74 IN | DIASTOLIC BLOOD PRESSURE: 40 MMHG | TEMPERATURE: 97.2 F | SYSTOLIC BLOOD PRESSURE: 68 MMHG | OXYGEN SATURATION: 96 % | WEIGHT: 190 LBS | BODY MASS INDEX: 24.38 KG/M2 | RESPIRATION RATE: 16 BRPM | HEART RATE: 81 BPM

## 2019-06-18 DIAGNOSIS — M48.061 NEURAL FORAMINAL STENOSIS OF LUMBAR SPINE: ICD-10-CM

## 2019-06-18 DIAGNOSIS — M15.9 GENERALIZED OA: ICD-10-CM

## 2019-06-18 DIAGNOSIS — Z79.899 ENCOUNTER FOR LONG-TERM (CURRENT) USE OF HIGH-RISK MEDICATION: ICD-10-CM

## 2019-06-18 DIAGNOSIS — M54.5 CHRONIC BILATERAL LOW BACK PAIN, WITH SCIATICA PRESENCE UNSPECIFIED: Primary | ICD-10-CM

## 2019-06-18 DIAGNOSIS — M48.07 NEURAL FORAMINAL STENOSIS OF LUMBOSACRAL SPINE: ICD-10-CM

## 2019-06-18 DIAGNOSIS — G89.4 CHRONIC PAIN SYNDROME: ICD-10-CM

## 2019-06-18 DIAGNOSIS — G89.29 CHRONIC BILATERAL LOW BACK PAIN, WITH SCIATICA PRESENCE UNSPECIFIED: Primary | ICD-10-CM

## 2019-06-18 RX ORDER — BUMETANIDE 1 MG/1
1 TABLET ORAL
Status: ON HOLD | COMMUNITY
Start: 2019-05-30 | End: 2019-10-26 | Stop reason: SDUPTHER

## 2019-06-18 NOTE — PROGRESS NOTES
Nursing Notes    Patient presents to the office today in follow-up. Patient rates his pain at 8/10 on the numerical pain scale. Reviewed medications with counts as follows:    Rx Date filled Qty Dispensed Pill Count Last Dose Short   Oxycodone  mg 6/1/19 110 54 today no                                       reviewed YES  Any aberrancies noted on  YES, undisclosed hydrocodone 5-325 mg   Last opioid agreement 11/29/18  Last urine drug screen 3/26/19  3 most recent PHQ Screens 6/18/2019   Little interest or pleasure in doing things Not at all   Feeling down, depressed, irritable, or hopeless Not at all   Total Score PHQ 2 0   Trouble falling or staying asleep, or sleeping too much -   Feeling tired or having little energy -   Poor appetite, weight loss, or overeating -   Feeling bad about yourself - or that you are a failure or have let yourself or your family down -   Trouble concentrating on things such as school, work, reading, or watching TV -   Moving or speaking so slowly that other people could have noticed; or the opposite being so fidgety that others notice -   Thoughts of being better off dead, or hurting yourself in some way -   PHQ 9 Score -   How difficult have these problems made it for you to do your work, take care of your home and get along with others -       Comments:     POC UDS was not performed in office today    Any new labs or imaging since last appointment? YES, lab    Have you been to an emergency room (ER) or urgent care clinic since your last visit? Yes [facility] for abdominal pain N/V  2 times           Have you been hospitalized since your last visit? YES, [facility] pacemaker placement    If yes, where, when, and reason for visit? Have you seen or consulted any other health care providers outside of the [address] [place] [address]  since your last visit? YES     If yes, where, when, and reason for visit?    Mr. Tacos Pearson has a reminder for a \"due or due soon\" health maintenance. I have asked that he contact his primary care provider for follow-up on this health maintenance.

## 2019-06-18 NOTE — PROGRESS NOTES
Patients B/P 60/40 with machine repeat 68/40. Patient c/o feeling nausea and having diarrhea today, moving slowly and feeling weak. Patient signed out AMA for wife to take him to [facility] ER, refused to let us call 911.

## 2019-06-18 NOTE — PROGRESS NOTES
Patient arrived to clinic today with blood pressure of 68/40 and repeat blood pressure 60/40 heart rate 54. Patient appears fatigued and lethargic. Patient was talking in complete sentences. Patient reports that he has had persistent diarrhea and also reported he was on an antibiotic at the beginning of May. Patient recently had a pacemaker placed in May. Strongly advised patient go to the emergency room via ambulance, patient declined signed AMA and would like his wife to take him to a [facility] facility.

## 2019-07-02 ENCOUNTER — TELEPHONE (OUTPATIENT)
Dept: PAIN MANAGEMENT | Age: 74
End: 2019-07-02

## 2019-07-02 NOTE — TELEPHONE ENCOUNTER
Patient called the office today concerning his medication refill request I  Completed the 4'S    OXYCODONE     40% pain relief    No--side affects     Yes -- able to perform daily task              Please give patient a call when meds are ready

## 2019-07-03 DIAGNOSIS — G89.4 CHRONIC PAIN SYNDROME: Primary | ICD-10-CM

## 2019-07-03 RX ORDER — OXYCODONE AND ACETAMINOPHEN 10; 325 MG/1; MG/1
1 TABLET ORAL
Qty: 36 TAB | Refills: 0 | Status: SHIPPED | OUTPATIENT
Start: 2019-07-03 | End: 2019-07-12

## 2019-07-03 NOTE — TELEPHONE ENCOUNTER
Jr Martin has called requesting a refill of their controlled medication, percocet, for the management of his chronic back pain. Last office visit date: 06/18/19  Last opioid care agreement 11/29/18  Last UDS was done 03/21/19    Date last  was pulled and reviewed : 07/03/19  Last fill date for medication was 06/01/19 for percocet 10/325 mg #110 tabs     Was the patient compliant when the above report was pulled? yes    Analgesia: pt reports a 40% pain relief with his current opioid medication regimen     Aberrancies: none noted     ADL's: pt reports that he is able to perform his normal daily tasks because he is taking his medication. Adverse Reaction: none reported by the pt at this time. Provider's last note and plan of care reviewed? Yes. Pt has not scheduled follow up appt yet. Will do when called about prescription. 06/18/19 visit was cut short due to pt's health. Pt was taken to ER by spouse. Request forwarded to provider for review.

## 2019-07-03 NOTE — TELEPHONE ENCOUNTER
Appt has been made for 07/10/19 at 1000. His wife states that he has been so weak and is not sure if he will be able to make it. She was advised that a prescription cannot be provided unless he has an office visit.

## 2019-10-22 PROBLEM — I50.9 CHF (CONGESTIVE HEART FAILURE) (HCC): Status: ACTIVE | Noted: 2019-10-22

## 2019-10-26 PROBLEM — R55 SYNCOPE: Status: ACTIVE | Noted: 2019-10-26

## 2019-11-25 NOTE — TELEPHONE ENCOUNTER
Kamla Webber has called requesting a refill of their controlled medication, Percocet 10/325 mg tab, for the management of chronic pain. Last office visit date: 3/21/19 with Bruno, and has a f/u appt on 6/14/19. Last opioid care agreement 11/28/18  Last UDS was done 3/21/19    Date last  was pulled and reviewed : 5/29/19  Last fill date for medication was 5/3/19    Was the patient compliant when the above report was pulled? Patient filled Norco 5/325 mg tab #12 tabs on 5/7/19 (Patient stated he was in in the hospital, and chart confirms, but patient didn't disclose medication). Analgesia: Patient reports 50% pain relief on current regimen. Aberrancies: Last FYI 5/2/19    ADL's: Patient states they are able to perform ADL's on current regimen. Adverse Reaction: Patient reports no adverse reactions at this time. Provider's last note and plan of care reviewed? yes  Request forwarded to provider for review.
Patient identified using two patient identifiers (name and ); patient advised prescriptions are ready for pick-up, and that they need to inform us that they need to inform us within 72 hrs when they received outside controlled substances. Patient also informed  hours are Mon-Fri 5214-7555. Patient verbalized understanding.
Reviewed, please remind patient of calling office within 3 days of receiving outside controlled medications as per his opioid care agreement.
No

## 2020-10-23 PROBLEM — I50.9 CHF (CONGESTIVE HEART FAILURE), NYHA CLASS II (HCC): Status: ACTIVE | Noted: 2020-10-23

## 2021-04-05 PROBLEM — K92.2 UPPER GI BLEED: Status: ACTIVE | Noted: 2021-04-05

## 2021-08-03 PROBLEM — I50.9 CHF (CONGESTIVE HEART FAILURE) (HCC): Status: RESOLVED | Noted: 2019-10-22 | Resolved: 2021-08-03
